# Patient Record
Sex: FEMALE | Race: WHITE | NOT HISPANIC OR LATINO | Employment: PART TIME | URBAN - METROPOLITAN AREA
[De-identification: names, ages, dates, MRNs, and addresses within clinical notes are randomized per-mention and may not be internally consistent; named-entity substitution may affect disease eponyms.]

---

## 2017-09-06 ENCOUNTER — NON-PROVIDER VISIT (OUTPATIENT)
Dept: OCCUPATIONAL MEDICINE | Facility: CLINIC | Age: 52
End: 2017-09-06

## 2017-09-06 DIAGNOSIS — Z11.1 ENCOUNTER FOR PPD TEST: ICD-10-CM

## 2017-09-06 PROCEDURE — 86580 TB INTRADERMAL TEST: CPT | Performed by: PREVENTIVE MEDICINE

## 2017-09-09 ENCOUNTER — NON-PROVIDER VISIT (OUTPATIENT)
Dept: URGENT CARE | Facility: CLINIC | Age: 52
End: 2017-09-09

## 2017-09-09 LAB — TB WHEAL 3D P 5 TU DIAM: NORMAL MM

## 2017-11-03 ENCOUNTER — TELEPHONE (OUTPATIENT)
Dept: CARDIOLOGY | Facility: MEDICAL CENTER | Age: 52
End: 2017-11-03

## 2017-11-03 NOTE — TELEPHONE ENCOUNTER
Left message on Dr. Dorado's answering machine to give me a call so we can attempt getting some records on patient.

## 2017-11-03 NOTE — TELEPHONE ENCOUNTER
Spoke with patient to see if she had seen a cardiologist before and to see if she had any recent cardiac testing; EKG done at Dr. Dorado's office and will call for that; no other testing done; advised to bring in pill bottles or a list of medications, ID and insurance cards and reminded her of date and time of appointment.

## 2017-11-07 ENCOUNTER — OFFICE VISIT (OUTPATIENT)
Dept: CARDIOLOGY | Facility: MEDICAL CENTER | Age: 52
End: 2017-11-07
Payer: MEDICARE

## 2017-11-07 VITALS
HEART RATE: 78 BPM | WEIGHT: 143 LBS | HEIGHT: 65 IN | OXYGEN SATURATION: 96 % | SYSTOLIC BLOOD PRESSURE: 102 MMHG | BODY MASS INDEX: 23.82 KG/M2 | DIASTOLIC BLOOD PRESSURE: 70 MMHG

## 2017-11-07 DIAGNOSIS — M79.7 FIBROMYALGIA: ICD-10-CM

## 2017-11-07 DIAGNOSIS — M47.817 LUMBOSACRAL SPONDYLOSIS WITHOUT MYELOPATHY: ICD-10-CM

## 2017-11-07 DIAGNOSIS — R00.2 PALPITATIONS: ICD-10-CM

## 2017-11-07 DIAGNOSIS — D35.2 PITUITARY ADENOMA (HCC): ICD-10-CM

## 2017-11-07 DIAGNOSIS — M47.814 THORACIC SPONDYLOSIS WITHOUT MYELOPATHY: ICD-10-CM

## 2017-11-07 PROCEDURE — 99204 OFFICE O/P NEW MOD 45 MIN: CPT | Performed by: INTERNAL MEDICINE

## 2017-11-07 RX ORDER — CYCLOBENZAPRINE HCL 10 MG
10 TABLET ORAL 3 TIMES DAILY PRN
COMMUNITY
End: 2019-02-20

## 2017-11-07 RX ORDER — ALPRAZOLAM 0.5 MG/1
0.5 TABLET ORAL NIGHTLY PRN
COMMUNITY
End: 2019-02-20

## 2017-11-07 RX ORDER — PANTOPRAZOLE SODIUM 20 MG/1
20 TABLET, DELAYED RELEASE ORAL DAILY
COMMUNITY

## 2017-11-07 RX ORDER — GABAPENTIN 600 MG/1
600 TABLET ORAL
COMMUNITY
End: 2019-09-03

## 2017-11-07 NOTE — PROGRESS NOTES
Subjective:   Enrique Briones is a 52 y.o. female who presents today For initial consultation regarding palpitations. She has a history of back pain status post spinal stimulator, seizure disorder on antiepileptic therapy and fibromyalgia. She has had palpitations for many many years, but they've increased over the past several months. They're responsive to Xanax and last for several minutes of time. She feels somewhat short of breath when they occur, no syncope or presyncope. She has never had a stroke. She notes no triggers. Mostly in the morning. He does not drink caffeine, smoke or drink. She does use medical marijuana. She is self described as very inactive. She has no exertional symptoms however. She also notes in passing history of pituitary adenoma which she has not had follow-up.    Past Medical History:   Diagnosis Date   • Fibromyalgia    • Headache(784.0)    • Migraine    • Seizure (CMS-HCC)     on meds - sz years ago -gm, catonic, petit mal     Past Surgical History:   Procedure Laterality Date   • CO DSTR NROLYTC AGNT PARVERTEB FCT SNGL LMBR/SACRAL Left 7/21/2015    Procedure: NEURO DEST FACET L/S W/IG SNGL - L4-S3;  Surgeon: Luis Manuel Mayfield;  Location: SURGERY [facility] ORS;  Service: Pain Management   • CO DSTR NROLYTC AGNT PARVERTEB FCT ADDL LMBR/SACRAL  7/21/2015    Procedure: NEURO DEST FACET L/S W/IG ADDL;  Surgeon: Luis Manuel Mayfield;  Location: SURGERY [facility] ORS;  Service: Pain Management   • PB INJECT RX OTHER PERIPH NERVE  7/21/2015    Procedure: NEUROLYTIC DEST-OTHER NERVE;  Surgeon: Luis Manuel Mayfield;  Location: SURGERY [facility] ORS;  Service: Pain Management   • PB INJECT RX OTHER PERIPH NERVE  7/21/2015    Procedure: NEUROLYTIC DEST-OTHER NERVE;  Surgeon: Luis Manuel Mayfield;  Location: SURGERY [facility] ORS;  Service: Pain Management   • PB INJECT RX OTHER PERIPH NERVE  7/21/2015    Procedure: NEUROLYTIC DEST-OTHER NERVE;  Surgeon: Luis Manuel Mayfield;  Location: SURGERY [facility] ORS;   Service: Pain Management   • NEURO DEST FACET C/T W/IG SNGL  3/10/2015    Performed by Luis Manuel Mayfield at [facility] ORS   • NEURO DEST FACET C/T W/IG ADDL  3/10/2015    Performed by Luis Manuel Mayfield at [facility] ORS   • NEURO DEST FACET C/T W/IG ADDL  3/10/2015    Performed by Luis Manuel Mayfield at [facility] ORS   • NEURO DEST FACET C/T W/IG ADDL  3/10/2015    Performed by Luis Manuel Mayfield at [facility] ORS   • NEURO DEST FACET L/S W/IG SNGL  11/18/2014    Performed by Luis Manuel Mayfield at [facility] ORS   • NEURO DEST FACET L/S W/IG ADDL  11/18/2014    Performed by Luis Manuel Mayfield at [facility] ORS   • OTHER ORTHOPEDIC SURGERY      right hip surgery     History reviewed. No pertinent family history.  History   Smoking Status   • Former Smoker   Smokeless Tobacco   • Never Used     Allergies   Allergen Reactions   • Codeine Vomiting   • Aspirin Nausea   • Latex Itching     Outpatient Encounter Prescriptions as of 11/7/2017   Medication Sig Dispense Refill   • gabapentin (NEURONTIN) 600 MG tablet Take 600 mg by mouth 3 times a day.     • cyclobenzaprine (FLEXERIL) 10 MG Tab Take 10 mg by mouth 3 times a day as needed.     • pantoprazole (PROTONIX) 20 MG tablet Take 20 mg by mouth every day.     • conjugated estrogen (PREMARIN) 0.625 MG Tab Take 0.625 mg by mouth every day.     • alprazolam (XANAX) 0.5 MG Tab Take 0.5 mg by mouth at bedtime as needed for Sleep.     • vitamin D, Ergocalciferol, (DRISDOL) 06444 UNITS CAPS capsule Take 50,000 Units by mouth every 14 days.     • Multiple Vitamins-Minerals (MULTI VITAMIN/MINERALS PO) Take  by mouth.     • divalproex EC (DEPAKOTE) 500 MG TBEC Take 500 mg by mouth 3 times a day.     • topiramate (TOPAMAX) 100 MG TABS Take 100 mg by mouth 2 times a day.     • duloxetine (CYMBALTA) 60 MG CPEP Take 60 mg by mouth every day.     • alprazolam (XANAX) 1 MG TABS Take 2 mg by mouth at bedtime as needed.     • [DISCONTINUED] cyclobenzaprine  "(FLEXERIL) 5 MG tablet Take 5-10 mg by mouth 3 times a day as needed.     • [DISCONTINUED] ondansetron (ZOFRAN ODT) 4 MG TABLET DISPERSIBLE Take 1 Tab by mouth every 6 hours as needed for Nausea/Vomiting. 10 Tab 0   • [DISCONTINUED] gabapentin (NEURONTIN) 400 MG CAPS Take 600 mg by mouth 3 times a day.     • [DISCONTINUED] Esomeprazole Magnesium (NEXIUM PO) Take  by mouth.     • [DISCONTINUED] conjugated estrogen (PREMARIN) 0.9 MG TABS Take 0.9 mg by mouth every day. DAYS 1-25     • [DISCONTINUED] promethazine (PHENERGAN) 25 MG TABS Take 25 mg by mouth every 6 hours as needed.       No facility-administered encounter medications on file as of 11/7/2017.      Review of Systems   All other systems reviewed and are negative.       Objective:   /70   Pulse 78   Ht 1.651 m (5' 5\")   Wt 64.9 kg (143 lb)   SpO2 96%   BMI 23.80 kg/m²     Physical Exam   Constitutional: She is oriented to person, place, and time. She appears well-developed and well-nourished. No distress.   HENT:   Head: Normocephalic and atraumatic.   Mouth/Throat: Oropharynx is clear and moist. No oropharyngeal exudate.   Eyes: Conjunctivae are normal. Pupils are equal, round, and reactive to light. No scleral icterus.   Neck: Normal range of motion. Neck supple. No JVD present. No thyromegaly present.   Cardiovascular: Normal rate, regular rhythm, normal heart sounds and intact distal pulses.  Exam reveals no gallop and no friction rub.    No murmur heard.  Pulses:       Carotid pulses are 2+ on the right side, and 2+ on the left side.       Radial pulses are 2+ on the right side, and 2+ on the left side.        Popliteal pulses are 2+ on the right side, and 2+ on the left side.        Dorsalis pedis pulses are 2+ on the right side, and 2+ on the left side.        Posterior tibial pulses are 2+ on the right side, and 2+ on the left side.   Pulmonary/Chest: Effort normal and breath sounds normal. She has no wheezes. She has no rales. "   Abdominal: Soft. Bowel sounds are normal. She exhibits no distension. There is no tenderness.   Musculoskeletal: She exhibits no edema or tenderness.   Neurological: She is alert and oriented to person, place, and time. No cranial nerve deficit (Cranial nerves II through XII grossly intact).   Skin: Skin is warm and dry. No rash noted. She is not diaphoretic. No erythema.   Psychiatric: She has a normal mood and affect. Her behavior is normal.   Vitals reviewed.    EKG (9/29/2017):  I have personally reviewed the EKG this visit and discussed with the patient. It shows sinus rhythm 66 with incomplete right bundle branch block.    Assessment:     1. Palpitations  EKG    ECHOCARDIOGRAM COMP W/O CONT    COMP METABOLIC PANEL    LIPID PROFILE    TSH    FREE THYROXINE    CARDIAC STRESS TEST TREADMILL ONLY    HOLTER MONITOR STUDY   2. Fibromyalgia     3. Lumbosacral spondylosis without myelopathy     4. Thoracic spondylosis without myelopathy     5. Pituitary adenoma (CMS-HCC)         Medical Decision Making:  Today's Assessment / Status / Plan:     She has no alarm symptoms. Her palpitations were present for many years, worse in several months ago and has slowly been improving. They occur several times per week. They're nonexertional. She has no triggers. She has an adenoma that was not followed up. I recommended she discuss this with her primary physician as I'm unclear on her history with regard to this. She is interested in a stress test as well as she has a family history of early coronary artery disease.    1. CBC, CMP, lipid profile, TSH and free T4  2. Holter monitor 48 hours  3. Treadmill stress test  4. Echocardiogram    Femur was unremarkable recommend reassurance. If there are abnormalities detected we will discuss these in follow-up. Reassurance was offered.        Thank you for this interesting consultation. It was my pleasure to see Enrique Briones today.    Erik Hernandez MD, FACC,  FSCAI  Division of Interventional Cardiology  [facility]

## 2017-11-07 NOTE — LETTER
[facility]   [address], [address]  [place] [address]  Phone: [phone]  Fax: [phone]              Enrique Briones  1965    Encounter Date: 11/7/2017    ,     Thank you for the referral. I had the pleasure of seeing Enrique Briones today in cardiology clinic. I've attached my visit note below. If you have any questions please feel free to give me a call anytime.      Erik Hernandez MD, FACC, [facility]  Interventional Cardiology  [facility]                                                                    PROGRESS NOTE:  Subjective:   Enrique Briones is a 52 y.o. female who presents today For initial consultation regarding palpitations. She has a history of back pain status post spinal stimulator, seizure disorder on antiepileptic therapy and fibromyalgia. She has had palpitations for many many years, but they've increased over the past several months. They're responsive to Xanax and last for several minutes of time. She feels somewhat short of breath when they occur, no syncope or presyncope. She has never had a stroke. She notes no triggers. Mostly in the morning. He does not drink caffeine, smoke or drink. She does use medical marijuana. She is self described as very inactive. She has no exertional symptoms however. She also notes in passing history of pituitary adenoma which she has not had follow-up.    Past Medical History:   Diagnosis Date   • Fibromyalgia    • Headache(784.0)    • Migraine    • Seizure (CMS-HCC)     on meds - sz years ago -gm, catonic, petit mal     Past Surgical History:   Procedure Laterality Date   • ME DSTR NROLYTC AGNT PARVERTEB FCT SNGL LMBR/SACRAL Left 7/21/2015    Procedure: NEURO DEST FACET L/S W/IG SNGL - L4-S3;  Surgeon: Luis Manuel Mayfield;  Location: [facility];  Service: Pain Management   • ME DSTR NROLYTC AGNT PARVERTEB FCT ADDL  LMBR/SACRAL  7/21/2015    Procedure: NEURO DEST FACET L/S W/IG ADDL;  Surgeon: Luis Manuel Mayfield;  Location: [facility] ORS;  Service: Pain Management   • PB INJECT RX OTHER PERIPH NERVE  7/21/2015    Procedure: NEUROLYTIC DEST-OTHER NERVE;  Surgeon: Luis Manuel Mayfield;  Location: [facility] ORS;  Service: Pain Management   • PB INJECT RX OTHER PERIPH NERVE  7/21/2015    Procedure: NEUROLYTIC DEST-OTHER NERVE;  Surgeon: Luis Manuel Mayfield;  Location: [facility] ORS;  Service: Pain Management   • PB INJECT RX OTHER PERIPH NERVE  7/21/2015    Procedure: NEUROLYTIC DEST-OTHER NERVE;  Surgeon: Luis Manuel Mayfield;  Location: [facility] ORS;  Service: Pain Management   • NEURO DEST FACET C/T W/IG SNGL  3/10/2015    Performed by Luis Manuel Mayfield at [facility] ORS   • NEURO DEST FACET C/T W/IG ADDL  3/10/2015    Performed by Luis Manuel Mayfield at [facility] ORS   • NEURO DEST FACET C/T W/IG ADDL  3/10/2015    Performed by Luis Manuel Mayfield at [facility] ORS   • NEURO DEST FACET C/T W/IG ADDL  3/10/2015    Performed by Luis Manuel Mayfield at [facility] ORS   • NEURO DEST FACET L/S W/IG SNGL  11/18/2014    Performed by Luis Manuel Mayfield at [facility] ORS   • NEURO DEST FACET L/S W/IG ADDL  11/18/2014    Performed by Luis Manuel Mayfield at [facility]   • OTHER ORTHOPEDIC SURGERY      right hip surgery     History reviewed. No pertinent family history.  History   Smoking Status   • Former Smoker   Smokeless Tobacco   • Never Used     Allergies   Allergen Reactions   • Codeine Vomiting   • Aspirin Nausea   • Latex Itching     Outpatient Encounter Prescriptions as of 11/7/2017   Medication Sig Dispense Refill   • gabapentin (NEURONTIN) 600 MG tablet Take 600 mg by mouth 3 times a day.     • cyclobenzaprine (FLEXERIL) 10 MG Tab Take 10 mg by mouth 3 times a day as needed.     • pantoprazole (PROTONIX) 20 MG tablet Take 20 mg by mouth every day.     • conjugated estrogen (PREMARIN)  "0.625 MG Tab Take 0.625 mg by mouth every day.     • alprazolam (XANAX) 0.5 MG Tab Take 0.5 mg by mouth at bedtime as needed for Sleep.     • vitamin D, Ergocalciferol, (DRISDOL) 93840 UNITS CAPS capsule Take 50,000 Units by mouth every 14 days.     • Multiple Vitamins-Minerals (MULTI VITAMIN/MINERALS PO) Take  by mouth.     • divalproex EC (DEPAKOTE) 500 MG TBEC Take 500 mg by mouth 3 times a day.     • topiramate (TOPAMAX) 100 MG TABS Take 100 mg by mouth 2 times a day.     • duloxetine (CYMBALTA) 60 MG CPEP Take 60 mg by mouth every day.     • alprazolam (XANAX) 1 MG TABS Take 2 mg by mouth at bedtime as needed.     • [DISCONTINUED] cyclobenzaprine (FLEXERIL) 5 MG tablet Take 5-10 mg by mouth 3 times a day as needed.     • [DISCONTINUED] ondansetron (ZOFRAN ODT) 4 MG TABLET DISPERSIBLE Take 1 Tab by mouth every 6 hours as needed for Nausea/Vomiting. 10 Tab 0   • [DISCONTINUED] gabapentin (NEURONTIN) 400 MG CAPS Take 600 mg by mouth 3 times a day.     • [DISCONTINUED] Esomeprazole Magnesium (NEXIUM PO) Take  by mouth.     • [DISCONTINUED] conjugated estrogen (PREMARIN) 0.9 MG TABS Take 0.9 mg by mouth every day. DAYS 1-25     • [DISCONTINUED] promethazine (PHENERGAN) 25 MG TABS Take 25 mg by mouth every 6 hours as needed.       No facility-administered encounter medications on file as of 11/7/2017.      Review of Systems   All other systems reviewed and are negative.       Objective:   /70   Pulse 78   Ht 1.651 m (5' 5\")   Wt 64.9 kg (143 lb)   SpO2 96%   BMI 23.80 kg/m²      Physical Exam   Constitutional: She is oriented to person, place, and time. She appears well-developed and well-nourished. No distress.   HENT:   Head: Normocephalic and atraumatic.   Mouth/Throat: Oropharynx is clear and moist. No oropharyngeal exudate.   Eyes: Conjunctivae are normal. Pupils are equal, round, and reactive to light. No scleral icterus.   Neck: Normal range of motion. Neck supple. No JVD present. No thyromegaly " present.   Cardiovascular: Normal rate, regular rhythm, normal heart sounds and intact distal pulses.  Exam reveals no gallop and no friction rub.    No murmur heard.  Pulses:       Carotid pulses are 2+ on the right side, and 2+ on the left side.       Radial pulses are 2+ on the right side, and 2+ on the left side.        Popliteal pulses are 2+ on the right side, and 2+ on the left side.        Dorsalis pedis pulses are 2+ on the right side, and 2+ on the left side.        Posterior tibial pulses are 2+ on the right side, and 2+ on the left side.   Pulmonary/Chest: Effort normal and breath sounds normal. She has no wheezes. She has no rales.   Abdominal: Soft. Bowel sounds are normal. She exhibits no distension. There is no tenderness.   Musculoskeletal: She exhibits no edema or tenderness.   Neurological: She is alert and oriented to person, place, and time. No cranial nerve deficit (Cranial nerves II through XII grossly intact).   Skin: Skin is warm and dry. No rash noted. She is not diaphoretic. No erythema.   Psychiatric: She has a normal mood and affect. Her behavior is normal.   Vitals reviewed.    EKG (9/29/2017):  I have personally reviewed the EKG this visit and discussed with the patient. It shows sinus rhythm 66 with incomplete right bundle branch block.    Assessment:     1. Palpitations  EKG    ECHOCARDIOGRAM COMP W/O CONT    COMP METABOLIC PANEL    LIPID PROFILE    TSH    FREE THYROXINE    CARDIAC STRESS TEST TREADMILL ONLY    HOLTER MONITOR STUDY   2. Fibromyalgia     3. Lumbosacral spondylosis without myelopathy     4. Thoracic spondylosis without myelopathy     5. Pituitary adenoma (CMS-[facility])         Medical Decision Making:  Today's Assessment / Status / Plan:     She has no alarm symptoms. Her palpitations were present for many years, worse in several months ago and has slowly been improving. They occur several times per week. They're nonexertional. She has no triggers. She has an adenoma that  was not followed up. I recommended she discuss this with her primary physician as I'm unclear on her history with regard to this. She is interested in a stress test as well as she has a family history of early coronary artery disease.    1. CBC, CMP, lipid profile, TSH and free T4  2. Holter monitor 48 hours  3. Treadmill stress test  4. Echocardiogram    Femur was unremarkable recommend reassurance. If there are abnormalities detected we will discuss these in follow-up. Reassurance was offered.        Thank you for this interesting consultation. It was my pleasure to see Enrique Briones today.    Erik Hernandez MD, FACC, [facility]  Division of Interventional Cardiology  [facility]        Mohsen Tamasaby, M.D.  [address] [address]  VIA Facsimile: [phone]

## 2017-11-15 ENCOUNTER — HOSPITAL ENCOUNTER (OUTPATIENT)
Dept: LAB | Facility: MEDICAL CENTER | Age: 52
End: 2017-11-15
Attending: INTERNAL MEDICINE
Payer: MEDICARE

## 2017-11-15 DIAGNOSIS — R00.2 PALPITATIONS: ICD-10-CM

## 2017-11-15 LAB
ALBUMIN SERPL BCP-MCNC: 4.2 G/DL (ref 3.2–4.9)
ALBUMIN/GLOB SERPL: 1.3 G/DL
ALP SERPL-CCNC: 49 U/L (ref 30–99)
ALT SERPL-CCNC: 13 U/L (ref 2–50)
ANION GAP SERPL CALC-SCNC: 5 MMOL/L (ref 0–11.9)
AST SERPL-CCNC: 20 U/L (ref 12–45)
BILIRUB SERPL-MCNC: 0.6 MG/DL (ref 0.1–1.5)
BUN SERPL-MCNC: 15 MG/DL (ref 8–22)
CALCIUM SERPL-MCNC: 9.7 MG/DL (ref 8.5–10.5)
CHLORIDE SERPL-SCNC: 108 MMOL/L (ref 96–112)
CHOLEST SERPL-MCNC: 219 MG/DL (ref 100–199)
CO2 SERPL-SCNC: 24 MMOL/L (ref 20–33)
CREAT SERPL-MCNC: 0.72 MG/DL (ref 0.5–1.4)
GFR SERPL CREATININE-BSD FRML MDRD: >60 ML/MIN/1.73 M 2
GLOBULIN SER CALC-MCNC: 3.2 G/DL (ref 1.9–3.5)
GLUCOSE SERPL-MCNC: 90 MG/DL (ref 65–99)
HDLC SERPL-MCNC: 77 MG/DL
LDLC SERPL CALC-MCNC: 130 MG/DL
POTASSIUM SERPL-SCNC: 4 MMOL/L (ref 3.6–5.5)
PROT SERPL-MCNC: 7.4 G/DL (ref 6–8.2)
SODIUM SERPL-SCNC: 137 MMOL/L (ref 135–145)
T4 FREE SERPL-MCNC: 0.76 NG/DL (ref 0.53–1.43)
TRIGL SERPL-MCNC: 58 MG/DL (ref 0–149)
TSH SERPL DL<=0.005 MIU/L-ACNC: 1.15 UIU/ML (ref 0.3–3.7)

## 2017-11-15 PROCEDURE — 80061 LIPID PANEL: CPT | Mod: GA

## 2017-11-15 PROCEDURE — 80053 COMPREHEN METABOLIC PANEL: CPT

## 2017-11-15 PROCEDURE — 36415 COLL VENOUS BLD VENIPUNCTURE: CPT

## 2017-11-15 PROCEDURE — 84443 ASSAY THYROID STIM HORMONE: CPT

## 2017-11-15 PROCEDURE — 84439 ASSAY OF FREE THYROXINE: CPT

## 2017-11-29 ENCOUNTER — HOSPITAL ENCOUNTER (OUTPATIENT)
Dept: CARDIOLOGY | Facility: MEDICAL CENTER | Age: 52
End: 2017-11-29
Attending: INTERNAL MEDICINE
Payer: MEDICARE

## 2017-11-29 ENCOUNTER — HOSPITAL ENCOUNTER (OUTPATIENT)
Dept: RADIOLOGY | Facility: MEDICAL CENTER | Age: 52
End: 2017-11-29
Attending: INTERNAL MEDICINE
Payer: MEDICARE

## 2017-11-29 DIAGNOSIS — R00.2 PALPITATIONS: ICD-10-CM

## 2017-11-29 PROCEDURE — 93306 TTE W/DOPPLER COMPLETE: CPT

## 2017-11-29 PROCEDURE — 93306 TTE W/DOPPLER COMPLETE: CPT | Mod: 26 | Performed by: INTERNAL MEDICINE

## 2017-11-29 PROCEDURE — 93018 CV STRESS TEST I&R ONLY: CPT | Performed by: INTERNAL MEDICINE

## 2017-11-29 PROCEDURE — 93017 CV STRESS TEST TRACING ONLY: CPT | Performed by: INTERNAL MEDICINE

## 2017-11-30 LAB
LV EJECT FRACT  99904: 70
LV EJECT FRACT MOD 2C 99903: 84.2
LV EJECT FRACT MOD 4C 99902: 55.48
LV EJECT FRACT MOD BP 99901: 72.09

## 2017-11-30 NOTE — PROCEDURES
DATE OF SERVICE:  11/29/2017    TREADMILL STRESS TEST    The baseline EKG is sinus rhythm.  RSR prime pattern in V1.  The patient has   good exercise tolerance, achieved 10.1 mets, appropriate heart rate and blood   pressure response, negative EKG criteria for inducible ischemia.  No   chronotropic incompetence.  No heart rate recovering delay.  The Duke score is   9.0, low cardiovascular risk.       ____________________________________     Ileana Mckinney MD, PhD, [facility] / [facility]    DD:  11/29/2017 17:24:20  DT:  11/30/2017 00:59:45    D#:  [id number]  Job#:  [id number]

## 2017-12-08 ENCOUNTER — TELEPHONE (OUTPATIENT)
Dept: CARDIOLOGY | Facility: MEDICAL CENTER | Age: 52
End: 2017-12-08

## 2017-12-08 NOTE — TELEPHONE ENCOUNTER
----- Message from Erik Hernandez M.D. sent at 12/7/2017  2:04 PM PST -----  Perfectly normal echocardiogram and a normal stress treadmill test.

## 2017-12-08 NOTE — TELEPHONE ENCOUNTER
Called patient, advised her of Dr. Hernandez's echocardiogram and treadmill stress test interpretations.    JOSSELYN RAO

## 2018-01-04 ENCOUNTER — NON-PROVIDER VISIT (OUTPATIENT)
Dept: CARDIOLOGY | Facility: MEDICAL CENTER | Age: 53
End: 2018-01-04
Payer: MEDICARE

## 2018-01-04 DIAGNOSIS — R00.2 PALPITATIONS: ICD-10-CM

## 2018-01-04 DIAGNOSIS — I47.10 SVT (SUPRAVENTRICULAR TACHYCARDIA): ICD-10-CM

## 2018-01-09 DIAGNOSIS — R00.2 PALPITATIONS: ICD-10-CM

## 2018-01-10 LAB — EKG IMPRESSION: NORMAL

## 2018-01-10 PROCEDURE — 93224 XTRNL ECG REC UP TO 48 HRS: CPT | Performed by: INTERNAL MEDICINE

## 2018-01-22 ENCOUNTER — TELEPHONE (OUTPATIENT)
Dept: CARDIOLOGY | Facility: MEDICAL CENTER | Age: 53
End: 2018-01-22

## 2018-01-22 NOTE — TELEPHONE ENCOUNTER
Pt notified of monitor results per Dr. Hernandez. She denies any questions at this time and will FU with TW on 2/6/18.    ----- Message from Erik Hernandez M.D. sent at 1/19/2018  3:52 PM PST -----  Nothing concerning on her monitor. She reported symptoms but there were no abnormalities during her reported times.

## 2018-03-06 ENCOUNTER — OFFICE VISIT (OUTPATIENT)
Dept: CARDIOLOGY | Facility: MEDICAL CENTER | Age: 53
End: 2018-03-06
Payer: MEDICARE

## 2018-03-06 VITALS
OXYGEN SATURATION: 96 % | WEIGHT: 143 LBS | BODY MASS INDEX: 23.82 KG/M2 | HEIGHT: 65 IN | HEART RATE: 88 BPM | SYSTOLIC BLOOD PRESSURE: 146 MMHG | DIASTOLIC BLOOD PRESSURE: 80 MMHG

## 2018-03-06 DIAGNOSIS — R00.2 PALPITATIONS: ICD-10-CM

## 2018-03-06 DIAGNOSIS — F44.5 CONVERSION DISORDER WITH SEIZURES OR CONVULSIONS: ICD-10-CM

## 2018-03-06 DIAGNOSIS — G43.019 MIGRAINE WITHOUT AURA, INTRACTABLE: ICD-10-CM

## 2018-03-06 DIAGNOSIS — M79.7 FIBROMYALGIA: ICD-10-CM

## 2018-03-06 PROCEDURE — 99214 OFFICE O/P EST MOD 30 MIN: CPT | Performed by: INTERNAL MEDICINE

## 2018-03-06 NOTE — PROGRESS NOTES
Subjective:   Enrique Briones is a 52 y.o. female who presents today for follow-up of palpitations. She has a history of back pain status post spinal stimulator, seizure disorder on antiepileptic therapy and fibromyalgia. She has had palpitations for many many years, but they've increased over the past several months. They're responsive to Xanax and last for several minutes of time. She feels somewhat short of breath when they occur, no syncope or presyncope. She has never had a stroke. She notes no triggers. Mostly in the morning. He does not drink caffeine, smoke or drink. She does use medical marijuana. She is self described as very inactive. She has no exertional symptoms however. She also notes in passing history of pituitary adenoma which she has not had follow-up.    In the interim she has had a Holter monitor that she says though she had symptoms they were mild compared to what she often will have, and it was completely normal monitor with no abnormalities. She also had a very normal echocardiogram and stress test.    Past Medical History:   Diagnosis Date   • Fibromyalgia    • Headache(784.0)    • Migraine    • Seizure (CMS-HCC)     on meds - sz years ago -gm, catonic, petit mal     Past Surgical History:   Procedure Laterality Date   • MI DSTR NROLYTC AGNT PARVERTEB FCT SNGL LMBR/SACRAL Left 7/21/2015    Procedure: NEURO DEST FACET L/S W/IG SNGL - L4-S3;  Surgeon: Luis Manuel Mayfield;  Location: SURGERY SURGICAL [facility] ORS;  Service: Pain Management   • MI DSTR NROLYTC AGNT PARVERTEB FCT ADDL LMBR/SACRAL  7/21/2015    Procedure: NEURO DEST FACET L/S W/IG ADDL;  Surgeon: Luis Manuel Mayfield;  Location: [facility];  Service: Pain Management   • PB INJECT RX OTHER PERIPH NERVE  7/21/2015    Procedure: NEUROLYTIC DEST-OTHER NERVE;  Surgeon: Luis Manuel Mayfield;  Location: SURGERY SURGICAL [facility];  Service: Pain Management   • PB INJECT RX OTHER PERIPH NERVE  7/21/2015    Procedure: NEUROLYTIC DEST-OTHER  NERVE;  Surgeon: Luis Manuel Mayfield;  Location: [facility] ORS;  Service: Pain Management   • PB INJECT RX OTHER PERIPH NERVE  7/21/2015    Procedure: NEUROLYTIC DEST-OTHER NERVE;  Surgeon: Luis Manuel Mayfield;  Location: [facility] ORS;  Service: Pain Management   • NEURO DEST FACET C/T W/IG SNGL  3/10/2015    Performed by Luis Manuel Mayfield at [facility] ORS   • NEURO DEST FACET C/T W/IG ADDL  3/10/2015    Performed by Luis Manuel Mayfield at [facility] ORS   • NEURO DEST FACET C/T W/IG ADDL  3/10/2015    Performed by Luis Manuel Mayfield at [facility] ORS   • NEURO DEST FACET C/T W/IG ADDL  3/10/2015    Performed by Luis Manuel Mayfield at [facility] ORS   • NEURO DEST FACET L/S W/IG SNGL  11/18/2014    Performed by Luis Manuel Mayfield at [facility] ORS   • NEURO DEST FACET L/S W/IG ADDL  11/18/2014    Performed by Luis Manuel Mayfield at [facility] ORS   • OTHER ORTHOPEDIC SURGERY      right hip surgery     History reviewed. No pertinent family history.  History   Smoking Status   • Former Smoker   Smokeless Tobacco   • Never Used     Allergies   Allergen Reactions   • Codeine Vomiting   • Aspirin Nausea   • Latex Itching     Outpatient Encounter Prescriptions as of 3/6/2018   Medication Sig Dispense Refill   • gabapentin (NEURONTIN) 600 MG tablet Take 600 mg by mouth 3 times a day.     • cyclobenzaprine (FLEXERIL) 10 MG Tab Take 10 mg by mouth 3 times a day as needed.     • pantoprazole (PROTONIX) 20 MG tablet Take 20 mg by mouth every day.     • conjugated estrogen (PREMARIN) 0.625 MG Tab Take 0.625 mg by mouth every day.     • alprazolam (XANAX) 0.5 MG Tab Take 0.5 mg by mouth at bedtime as needed for Sleep.     • Multiple Vitamins-Minerals (MULTI VITAMIN/MINERALS PO) Take  by mouth.     • divalproex EC (DEPAKOTE) 500 MG TBEC Take 500 mg by mouth 3 times a day.     • topiramate (TOPAMAX) 100 MG TABS Take 100 mg by mouth 2 times a day.     • duloxetine (CYMBALTA) 60 MG CPEP Take 60 mg by  "mouth every day.     • alprazolam (XANAX) 1 MG TABS Take 2 mg by mouth at bedtime as needed.     • vitamin D, Ergocalciferol, (DRISDOL) 85659 UNITS CAPS capsule Take 50,000 Units by mouth every 14 days.       No facility-administered encounter medications on file as of 3/6/2018.      Review of Systems   All other systems reviewed and are negative.       Objective:   /80   Pulse 88   Ht 1.651 m (5' 5\")   Wt 64.9 kg (143 lb)   SpO2 96%   BMI 23.80 kg/m²     Physical Exam   Constitutional: She is oriented to person, place, and time. She appears well-developed and well-nourished. No distress.   HENT:   Head: Normocephalic and atraumatic.   Mouth/Throat: Oropharynx is clear and moist. No oropharyngeal exudate.   Eyes: Conjunctivae are normal. Pupils are equal, round, and reactive to light. No scleral icterus.   Neck: Normal range of motion. Neck supple. No JVD present. No thyromegaly present.   Cardiovascular: Normal rate, regular rhythm, normal heart sounds and intact distal pulses.  Exam reveals no gallop and no friction rub.    No murmur heard.  Pulses:       Carotid pulses are 2+ on the right side, and 2+ on the left side.       Radial pulses are 2+ on the right side, and 2+ on the left side.        Popliteal pulses are 2+ on the right side, and 2+ on the left side.        Dorsalis pedis pulses are 2+ on the right side, and 2+ on the left side.        Posterior tibial pulses are 2+ on the right side, and 2+ on the left side.   Pulmonary/Chest: Effort normal and breath sounds normal. She has no wheezes. She has no rales.   Abdominal: Soft. Bowel sounds are normal. She exhibits no distension. There is no tenderness.   Musculoskeletal: She exhibits no edema or tenderness.   Neurological: She is alert and oriented to person, place, and time. No cranial nerve deficit (Cranial nerves II through XII grossly intact).   Skin: Skin is warm and dry. No rash noted. She is not diaphoretic. No erythema.   Psychiatric: " She has a normal mood and affect. Her behavior is normal.   Vitals reviewed.    EKG (9/29/2017):  I have personally reviewed the EKG this visit and discussed with the patient. It shows sinus rhythm 66 with incomplete right bundle branch block.    Assessment:     1. Palpitations  HOLTER MONITOR / EVENT RECORDER   2. Fibromyalgia     3. Migraine without aura, intractable     4. Conversion disorder with seizures or convulsions         Medical Decision Making:  Today's Assessment / Status / Plan:     She has no alarm symptoms. Her palpitations did not correlate with any rhythm abnormalities on testing however she says her symptoms were very mild while wearing the 48 hour monitor. Laboratory studies are unremarkable. All cardiac studies thus far are low risk and very reassuring.    1. 21 day monitor with symptom diary. Rreassurance was offered.       Thank you for this interesting consultation. It was my pleasure to see Enrique Briones today.    Erik Hernandez MD, FACC, [facility]  Division of Interventional Cardiology  [facility]

## 2018-03-13 ENCOUNTER — NON-PROVIDER VISIT (OUTPATIENT)
Dept: CARDIOLOGY | Facility: MEDICAL CENTER | Age: 53
End: 2018-03-13
Attending: INTERNAL MEDICINE
Payer: MEDICARE

## 2018-03-13 DIAGNOSIS — R00.2 PALPITATIONS: ICD-10-CM

## 2018-03-14 ENCOUNTER — TELEPHONE (OUTPATIENT)
Dept: CARDIOLOGY | Facility: MEDICAL CENTER | Age: 53
End: 2018-03-14

## 2018-04-11 PROCEDURE — 0296T PR EXT ECG > 48HR TO 21 DAY RCRD W/CONECT INTL RCRD: CPT | Performed by: INTERNAL MEDICINE

## 2018-04-11 PROCEDURE — 0298T PR EXT ECG > 48HR TO 21 DAY REVIEW AND INTERPRETATN: CPT | Performed by: INTERNAL MEDICINE

## 2018-04-12 ENCOUNTER — TELEPHONE (OUTPATIENT)
Dept: CARDIOLOGY | Facility: MEDICAL CENTER | Age: 53
End: 2018-04-12

## 2018-04-12 NOTE — TELEPHONE ENCOUNTER
"Zio patch reviewed by Dr. Hernandez. Per TW, \"Normal sinus rhythm. Symptoms without heart rhythm abnormalities.\"    L/m educating pt of results. Informed her to please call back to discuss any questions or concerns. FU with TW 9/4  "

## 2018-08-18 ENCOUNTER — OFFICE VISIT (OUTPATIENT)
Dept: URGENT CARE | Facility: CLINIC | Age: 53
End: 2018-08-18
Payer: MEDICARE

## 2018-08-18 VITALS
DIASTOLIC BLOOD PRESSURE: 64 MMHG | RESPIRATION RATE: 16 BRPM | TEMPERATURE: 97.9 F | HEIGHT: 65 IN | SYSTOLIC BLOOD PRESSURE: 128 MMHG | WEIGHT: 143 LBS | BODY MASS INDEX: 23.82 KG/M2 | OXYGEN SATURATION: 97 % | HEART RATE: 72 BPM

## 2018-08-18 DIAGNOSIS — J06.9 UPPER RESPIRATORY TRACT INFECTION, UNSPECIFIED TYPE: ICD-10-CM

## 2018-08-18 PROCEDURE — 99204 OFFICE O/P NEW MOD 45 MIN: CPT | Performed by: PHYSICIAN ASSISTANT

## 2018-08-18 RX ORDER — BENZONATATE 200 MG/1
200 CAPSULE ORAL 3 TIMES DAILY PRN
Qty: 30 CAP | Refills: 0 | Status: SHIPPED | OUTPATIENT
Start: 2018-08-18 | End: 2019-02-20

## 2018-08-18 RX ORDER — AMOXICILLIN AND CLAVULANATE POTASSIUM 875; 125 MG/1; MG/1
1 TABLET, FILM COATED ORAL 2 TIMES DAILY
Qty: 14 TAB | Refills: 0 | Status: SHIPPED | OUTPATIENT
Start: 2018-08-18 | End: 2018-08-25

## 2018-08-18 ASSESSMENT — ENCOUNTER SYMPTOMS
SHORTNESS OF BREATH: 0
WHEEZING: 0
PALPITATIONS: 0
FEVER: 0
CHILLS: 0
SORE THROAT: 1
COUGH: 1
MYALGIAS: 1
SPUTUM PRODUCTION: 0
HEMOPTYSIS: 0

## 2018-08-18 NOTE — PROGRESS NOTES
Subjective:      Enrique Briones is a 53 y.o. female who presents with Cough (x 1 day w/possible URI)            Cough   This is a new problem. The current episode started yesterday. The problem has been unchanged. The cough is non-productive. Associated symptoms include ear congestion, myalgias and a sore throat. Pertinent negatives include no chest pain, chills, ear pain, fever, hemoptysis, shortness of breath or wheezing. Nothing aggravates the symptoms. She has tried nothing for the symptoms.       Review of Systems   Constitutional: Positive for malaise/fatigue. Negative for chills and fever.   HENT: Positive for congestion and sore throat. Negative for ear pain.    Respiratory: Positive for cough. Negative for hemoptysis, sputum production, shortness of breath and wheezing.    Cardiovascular: Negative for chest pain and palpitations.   Musculoskeletal: Positive for myalgias.   All other systems reviewed and are negative.    PMH:  has a past medical history of Fibromyalgia; Headache(784.0); Migraine; and Seizure (HCC). She also has no past medical history of Pregnant state, incidental.  MEDS:   Current Outpatient Prescriptions:   •  amoxicillin-clavulanate (AUGMENTIN) 875-125 MG Tab, Take 1 Tab by mouth 2 times a day for 7 days., Disp: 14 Tab, Rfl: 0  •  benzonatate (TESSALON) 200 MG capsule, Take 1 Cap by mouth 3 times a day as needed for Cough., Disp: 30 Cap, Rfl: 0  •  gabapentin (NEURONTIN) 600 MG tablet, Take 600 mg by mouth 3 times a day., Disp: , Rfl:   •  pantoprazole (PROTONIX) 20 MG tablet, Take 20 mg by mouth every day., Disp: , Rfl:   •  conjugated estrogen (PREMARIN) 0.625 MG Tab, Take 0.625 mg by mouth every day., Disp: , Rfl:   •  alprazolam (XANAX) 0.5 MG Tab, Take 0.5 mg by mouth at bedtime as needed for Sleep., Disp: , Rfl:   •  vitamin D, Ergocalciferol, (DRISDOL) 41937 UNITS CAPS capsule, Take 50,000 Units by mouth every 14 days., Disp: , Rfl:   •  Multiple Vitamins-Minerals (MULTI  VITAMIN/MINERALS PO), Take  by mouth., Disp: , Rfl:   •  divalproex EC (DEPAKOTE) 500 MG TBEC, Take 500 mg by mouth 3 times a day., Disp: , Rfl:   •  topiramate (TOPAMAX) 100 MG TABS, Take 100 mg by mouth 2 times a day., Disp: , Rfl:   •  duloxetine (CYMBALTA) 60 MG CPEP, Take 60 mg by mouth every day., Disp: , Rfl:   •  cyclobenzaprine (FLEXERIL) 10 MG Tab, Take 10 mg by mouth 3 times a day as needed., Disp: , Rfl:   •  alprazolam (XANAX) 1 MG TABS, Take 2 mg by mouth at bedtime as needed., Disp: , Rfl:   ALLERGIES:   Allergies   Allergen Reactions   • Codeine Vomiting   • Aspirin Nausea   • Latex Itching     SURGHX:   Past Surgical History:   Procedure Laterality Date   • [place] DSTR NROLYTC AGNT PARVERTEB FCT SNGL LMBR/SACRAL Left 7/21/2015    Procedure: NEURO DEST FACET L/S W/IG SNGL - L4-S3;  Surgeon: Luis Manuel Mayfield;  Location: [facility] ORS;  Service: Pain Management   • NJ DSTR NROLYTC AGNT PARVERTEB FCT ADDL LMBR/SACRAL  7/21/2015    Procedure: NEURO DEST FACET L/S W/IG ADDL;  Surgeon: Luis Manuel Mayfield;  Location: [facility] ORS;  Service: Pain Management   • PB INJECT RX OTHER PERIPH NERVE  7/21/2015    Procedure: NEUROLYTIC DEST-OTHER NERVE;  Surgeon: Luis Manuel Mayfield;  Location: SURGERY [facility];  Service: Pain Management   • PB INJECT RX OTHER PERIPH NERVE  7/21/2015    Procedure: NEUROLYTIC DEST-OTHER NERVE;  Surgeon: Luis Manuel Mayfield;  Location: SURGERY [facility];  Service: Pain Management   • PB INJECT RX OTHER PERIPH NERVE  7/21/2015    Procedure: NEUROLYTIC DEST-OTHER NERVE;  Surgeon: Luis Manuel Mayfield;  Location: [facility] ORS;  Service: Pain Management   • NEURO DEST FACET C/T W/IG SNGL  3/10/2015    Performed by Luis Manuel Mayfield at [facility]   • NEURO DEST FACET C/T W/IG ADDL  3/10/2015    Performed by Luis Manuel Mayfield at [facility]   • NEURO DEST FACET C/T W/IG ADDL  3/10/2015    Performed by Luis Manuel Mayfield at [facility]   • NEURO DEST  "FACET C/T W/IG ADDL  3/10/2015    Performed by Luis Manuel Mayfield at [facility]   • NEURO DEST FACET L/S W/IG SNGL  11/18/2014    Performed by Luis Manuel Mayfield at [facility] OR[facility]   • NEURO DEST FACET L/S W/IG ADDL  11/18/2014    Performed by Luis Manuel Mayfield at [facility]   • OTHER ORTHOPEDIC SURGERY      right hip surgery     SOCHX:  reports that she has quit smoking. She has never used smokeless tobacco. She reports that she does not drink alcohol or use drugs.  FH: Family history was reviewed, no pertinent findings to report  Medications, Allergies, and current problem list reviewed today in Epic       Objective:     /64   Pulse 72   Temp 36.6 °C (97.9 °F)   Resp 16   Ht 1.651 m (5' 5\")   Wt 64.9 kg (143 lb)   SpO2 97%   Breastfeeding? No   BMI 23.80 kg/m²      Physical Exam   Constitutional: She is oriented to person, place, and time. She appears well-developed and well-nourished. She is active.  Non-toxic appearance. She does not have a sickly appearance. She does not appear ill. No distress. She is not intubated.   HENT:   Head: Normocephalic and atraumatic.   Right Ear: Hearing, tympanic membrane, external ear and ear canal normal.   Left Ear: Hearing, tympanic membrane, external ear and ear canal normal.   Nose: Nose normal.   Mouth/Throat: Uvula is midline, oropharynx is clear and moist and mucous membranes are normal.   Eyes: Conjunctivae, EOM and lids are normal.   Neck: Normal range of motion and full passive range of motion without pain. Neck supple.   Cardiovascular: Regular rhythm, S1 normal, S2 normal and normal heart sounds.  Exam reveals no gallop and no friction rub.    No murmur heard.  Pulmonary/Chest: Effort normal and breath sounds normal. No accessory muscle usage. No apnea, no tachypnea and no bradypnea. She is not intubated. No respiratory distress. She has no decreased breath sounds. She has no wheezes. She has no rhonchi. She has no rales. She exhibits " no tenderness.   Musculoskeletal: Normal range of motion.   Neurological: She is alert and oriented to person, place, and time.   Skin: Skin is warm and dry.   Psychiatric: She has a normal mood and affect. Her speech is normal and behavior is normal. Judgment and thought content normal.   Vitals reviewed.              Assessment/Plan:   Discussed most likely viral uri.  Contingent antibiotic prescription given to patient to fill upon meeting criteria of guidelines discussed.     1. Upper respiratory tract infection, unspecified type    - amoxicillin-clavulanate (AUGMENTIN) 875-125 MG Tab; Take 1 Tab by mouth 2 times a day for 7 days.  Dispense: 14 Tab; Refill: 0  - benzonatate (TESSALON) 200 MG capsule; Take 1 Cap by mouth 3 times a day as needed for Cough.  Dispense: 30 Cap; Refill: 0    Differential diagnosis, natural history, supportive care discussed. Follow-up with primary care provider within 7-10 days, emergency room precautions discussed.  Patient and/or family appears understanding of information.  Handout and review of patients diagnosis and treatment was discussed extensively.

## 2018-08-23 ENCOUNTER — NON-PROVIDER VISIT (OUTPATIENT)
Dept: OCCUPATIONAL MEDICINE | Facility: CLINIC | Age: 53
End: 2018-08-23
Payer: MEDICARE

## 2018-08-23 DIAGNOSIS — Z11.1 ENCOUNTER FOR PPD TEST: ICD-10-CM

## 2018-08-23 PROCEDURE — 86580 TB INTRADERMAL TEST: CPT | Performed by: PREVENTIVE MEDICINE

## 2018-08-25 ENCOUNTER — NON-PROVIDER VISIT (OUTPATIENT)
Dept: URGENT CARE | Facility: CLINIC | Age: 53
End: 2018-08-25

## 2018-08-25 LAB — TB WHEAL 3D P 5 TU DIAM: NORMAL MM

## 2018-11-24 ENCOUNTER — HOSPITAL ENCOUNTER (EMERGENCY)
Facility: MEDICAL CENTER | Age: 53
End: 2018-11-24
Attending: EMERGENCY MEDICINE
Payer: MEDICARE

## 2018-11-24 VITALS
RESPIRATION RATE: 16 BRPM | HEART RATE: 80 BPM | WEIGHT: 145 LBS | OXYGEN SATURATION: 98 % | SYSTOLIC BLOOD PRESSURE: 137 MMHG | HEIGHT: 65 IN | TEMPERATURE: 98.4 F | DIASTOLIC BLOOD PRESSURE: 80 MMHG | BODY MASS INDEX: 24.16 KG/M2

## 2018-11-24 DIAGNOSIS — G89.29 ACUTE EXACERBATION OF CHRONIC LOW BACK PAIN: ICD-10-CM

## 2018-11-24 DIAGNOSIS — M54.50 ACUTE EXACERBATION OF CHRONIC LOW BACK PAIN: ICD-10-CM

## 2018-11-24 PROCEDURE — 96374 THER/PROPH/DIAG INJ IV PUSH: CPT

## 2018-11-24 PROCEDURE — 99284 EMERGENCY DEPT VISIT MOD MDM: CPT

## 2018-11-24 PROCEDURE — 700111 HCHG RX REV CODE 636 W/ 250 OVERRIDE (IP): Performed by: EMERGENCY MEDICINE

## 2018-11-24 RX ORDER — ONDANSETRON 4 MG/1
4 TABLET, ORALLY DISINTEGRATING ORAL EVERY 6 HOURS PRN
Qty: 20 TAB | Refills: 0 | Status: SHIPPED | OUTPATIENT
Start: 2018-11-24

## 2018-11-24 RX ORDER — HYDROCODONE BITARTRATE AND ACETAMINOPHEN 5; 325 MG/1; MG/1
1-2 TABLET ORAL EVERY 6 HOURS PRN
Qty: 15 TAB | Refills: 0 | Status: SHIPPED | OUTPATIENT
Start: 2018-11-24 | End: 2018-11-27

## 2018-11-24 RX ORDER — HYDROMORPHONE HYDROCHLORIDE 1 MG/ML
1 INJECTION, SOLUTION INTRAMUSCULAR; INTRAVENOUS; SUBCUTANEOUS ONCE
Status: COMPLETED | OUTPATIENT
Start: 2018-11-24 | End: 2018-11-24

## 2018-11-24 RX ADMIN — HYDROMORPHONE HYDROCHLORIDE 1 MG: 1 INJECTION, SOLUTION INTRAMUSCULAR; INTRAVENOUS; SUBCUTANEOUS at 13:30

## 2018-11-24 ASSESSMENT — LIFESTYLE VARIABLES: DO YOU DRINK ALCOHOL: NO

## 2018-11-24 ASSESSMENT — PAIN SCALES - GENERAL: PAINLEVEL_OUTOF10: 3

## 2018-11-24 NOTE — ED PROVIDER NOTES
ED Provider Note      ER PROVIDER NOTE        CHIEF COMPLAINT  Chief Complaint   Patient presents with   • Back Pain     pt reports sudden sharp low back pain yesterday after bending over. pt has chronic back pain/ pt reports internal stimulator for back pain       HPI  Enrique Briones is a 53 y.o. female who presents to the emergency department complaining of low back pain.  Patient reports that yesterday she was bending over she felt something stretch in her back and has had pain since.  She has a history of chronic low back pain and has a stimulator in place.  She denies any focal weakness numbness or tingling.  No bowel or bladder incontinence.  No fevers or chills.  No history of immunocompromise    REVIEW OF SYSTEMS  Pertinent positives include back pain. Pertinent negatives include no weakness or numbness. See HPI for details. All other systems reviewed and are negative.    PAST MEDICAL HISTORY   has a past medical history of Fibromyalgia; Headache(784.0); Migraine; and Seizure (HCC).    SURGICAL HISTORY   has a past surgical history that includes neuro dest facet l/s w/ig sngl (11/18/2014); neuro dest facet l/s w/ig addl (11/18/2014); other orthopedic surgery; neuro dest facet c/t w/ig sngl (3/10/2015); neuro dest facet c/t w/ig addl (3/10/2015); neuro dest facet c/t w/ig addl (3/10/2015); neuro dest facet c/t w/ig addl (3/10/2015); dstr nrolytc agnt parverteb fct sngl lmbr/sacral (Left, 7/21/2015); dstr nrolytc agnt parverteb fct addl lmbr/sacral (7/21/2015); inject rx other periph nerve (7/21/2015); inject rx other periph nerve (7/21/2015); and inject rx other periph nerve (7/21/2015).    FAMILY HISTORY  No family history on file.    SOCIAL HISTORY  Social History     Social History   • Marital status: Single     Spouse name: N/A   • Number of children: N/A   • Years of education: N/A     Social History Main Topics   • Smoking status: Former Smoker   • Smokeless tobacco: Never Used   • Alcohol  "use No   • Drug use: No   • Sexual activity: Yes     Partners: Female     Other Topics Concern   • Not on file     Social History Narrative   • No narrative on file      History   Drug Use No       CURRENT MEDICATIONS  Home Medications    **Home medications have not yet been reviewed for this encounter**         ALLERGIES  Allergies   Allergen Reactions   • Codeine Vomiting   • Aspirin Nausea   • Latex Itching       PHYSICAL EXAM  VITAL SIGNS: /72   Pulse 72   Temp 36.9 °C (98.4 °F) (Temporal)   Resp 16   Ht 1.651 m (5' 5\")   Wt 65.8 kg (145 lb)   BMI 24.13 kg/m²   Pulse ox interpretation:I interpret this pulse ox as normal.    Constitutional: Alert in no apparent distress.  HENT: No signs of trauma, Bilateral external ears normal, Nose normal.   Eyes: Pupils are equal and reactive, Conjunctiva normal, Non-icteric.   Neck: Normal range of motion, No tenderness, Supple, No stridor.   Lymphatic: No lymphadenopathy noted.   Cardiovascular: Regular rate and rhythm, no murmurs.   Thorax & Lungs: Normal breath sounds, No respiratory distress, No wheezing, No chest tenderness.   Abdomen: Bowel sounds normal, Soft, No tenderness, No masses, No pulsatile masses. No peritoneal signs.  Skin: Warm, Dry, No erythema, No rash.   Back: No bony tenderness, No CVA tenderness.   Extremities: Intact distal pulses, No edema, No tenderness, No cyanosis, Negative Linda's sign.  Musculoskeletal: Good range of motion in all major joints. No tenderness to palpation or major deformities noted.   Neurologic: Alert, cranial nerves intact, speech is appropriate or not slurred, upper extremities bilaterally exhibit no drift, no dysmetria, 5 out of 5 strength with bilateral bicep/tricep/, sensation intact to light touch throughout upper extremities. Lower extremities strength 5 out of 5 thigh extension/flexion/abduction/adduction, knee extension/flexion, dorsiflexion plantar flexion. No clonus.  2+ patella reflexes.  sensation " intact to light touch.  No focal deficits noted. Ambulates with steady gait, steady tandem gait  Psychiatric: Affect normal, Judgment normal, Mood normal.       DIAGNOSTIC STUDIES / PROCEDURES      RADIOLOGY  No orders to display     The radiologist's interpretation of all radiological studies have been reviewed by me.    COURSE & MEDICAL DECISION MAKING  Nursing notes, VS, PMSFHx reviewed in chart.    1:24 PM Patient seen and examined at bedside. Patient will be treated with hydromorphone.    3:02 PM patient's pain is much better, ambulates well, will plan on discharge        Decision Making:  This is a 53 y.o. female present with low back pain.  This appears to be acute exacerbation of her chronic pain.   No focal neuro sx, saddle anesthesia, bowel/bladder sx to suggest cord compromise, cauda equina or spinal compression syndrome, afebrile and no systemic infx sx or immunocompromise or significant midline tenderness to suggest focal infection such as discitis or abscess, no new trauma or midline tenderness to suggest fracture, no flank or abdominal pain or urinary sx to suggest renal or abdominal pathology such as AAA.  While her device may not be functioning appropriately does not appear to represent a dangerous condition at this point and she will follow-up with her pain management doctor on Monday to address the functioning of her device  Pt dc to home with outpt fu. Strict return precautions given regarding new neuro sx, urinary sx, pain, fever, etc. Pt understood well            I reviewed prescription monitoring program for patient's narcotic use before prescribing a scheduled drug.The patient will not drink alcohol nor drive with prescribed medications. The patient will return for new or worsening symptoms and is stable at the time of discharge.    The patient is referred to a primary physician for blood pressure management, diabetic screening, and for all other preventative health concerns.    In  prescribing controlled substances to this patient, I certify that I have obtained and reviewed the medical history of Enrique Briones. I have also made a good princess effort to obtain applicable records from other providers who have treated the patient and records did not demonstrate any increased risk of substance abuse that would prevent me from prescribing controlled substances.     I have conducted a physical exam and documented it. I have reviewed Ms. Briones’s prescription history as maintained by the [place] Prescription Monitoring Program.     I have assessed the patient’s risk for abuse, dependency, and addiction using the validated Opioid Risk Tool available at https://www.mdcalc.com/gzgast-kbse-smss-ort-narcotic-abuse.     Given the above, I believe the benefits of controlled substance therapy outweigh the risks. The reasons for prescribing controlled substances include non-narcotic, oral analgesic alternatives have been inadequate for pain control. Accordingly, I have discussed the risk and benefits, treatment plan, and alternative therapies with the patient.         DISPOSITION:  Patient will be discharged home in stable condition.    FOLLOW UP:  Mohsen Tamasaby, M.D.  [address] [place] [id number] [phone]    In 3 days        OUTPATIENT MEDICATIONS:  New Prescriptions    HYDROCODONE-ACETAMINOPHEN (NORCO) 5-325 MG TAB PER TABLET    Take 1-2 Tabs by mouth every 6 hours as needed for up to 3 days.         FINAL IMPRESSION  1. Acute exacerbation of chronic low back pain         The note accurately reflects work and decisions made by me.  Easton Mims  11/24/2018  3:32 PM

## 2018-11-24 NOTE — ED TRIAGE NOTES
Chief Complaint   Patient presents with   • Back Pain     pt reports sudden sharp low back pain yesterday after bending over. pt has chronic back pain/ pt reports internal stimulator for back pain     ERP at bedside.

## 2018-11-24 NOTE — ED NOTES
Pt given discharge instructions and prescriptions. Pt verbalized understanding. RN to answer any questions pt had. Pt instructed not to drive after receiving narcotics and knows about follow up care with pain doctor. VSS. Pt wheeled out to front lobby.

## 2018-11-24 NOTE — ED NOTES
"Patient able to up self walk to restroom and back to [place] [facility] with steady gate. She stated \"pain is mild, that pain med really helped\". Chart up for rechecked, vital signs rechecked.   "

## 2019-02-20 ENCOUNTER — OFFICE VISIT (OUTPATIENT)
Dept: NEUROLOGY | Facility: MEDICAL CENTER | Age: 54
End: 2019-02-20
Payer: MEDICARE

## 2019-02-20 VITALS
SYSTOLIC BLOOD PRESSURE: 122 MMHG | HEIGHT: 65 IN | HEART RATE: 83 BPM | TEMPERATURE: 97.3 F | OXYGEN SATURATION: 94 % | WEIGHT: 143.6 LBS | DIASTOLIC BLOOD PRESSURE: 64 MMHG | BODY MASS INDEX: 23.93 KG/M2

## 2019-02-20 DIAGNOSIS — G43.019 MIGRAINE WITHOUT AURA, INTRACTABLE: ICD-10-CM

## 2019-02-20 DIAGNOSIS — H93.13 TINNITUS OF BOTH EARS: ICD-10-CM

## 2019-02-20 PROCEDURE — 99203 OFFICE O/P NEW LOW 30 MIN: CPT | Performed by: NURSE PRACTITIONER

## 2019-02-20 RX ORDER — TOPIRAMATE 100 MG/1
100 CAPSULE, EXTENDED RELEASE ORAL 2 TIMES DAILY
Qty: 60 EACH | Refills: 5 | Status: SHIPPED | OUTPATIENT
Start: 2019-02-20 | End: 2019-09-03

## 2019-02-20 ASSESSMENT — ENCOUNTER SYMPTOMS
NERVOUS/ANXIOUS: 0
ABDOMINAL PAIN: 0
VOMITING: 0
COUGH: 0
DIARRHEA: 0
DOUBLE VISION: 0
MUSCULOSKELETAL NEGATIVE: 1
NAUSEA: 0
SEIZURES: 0
SORE THROAT: 0
DEPRESSION: 0
CONSTITUTIONAL NEGATIVE: 1
HEADACHES: 1
MEMORY LOSS: 1

## 2019-02-20 ASSESSMENT — PATIENT HEALTH QUESTIONNAIRE - PHQ9: CLINICAL INTERPRETATION OF PHQ2 SCORE: 0

## 2019-02-20 NOTE — PROGRESS NOTES
Subjective:      Enrique Briones is a 53 y.o. female who presents with [facility] Care (Conversion disorder with seizures or convulsions)          HPI  Has been seen per Dr Bloch in 2013, nearly 67 years ago.    Has been seen per neuro-surgeon here in [place]-- unknown physician name.    [facility] Brain MRI: arachnoid cyst adjacent to the right frontal lobe measuring 4 X 0.9cm.    Feels sick every time she takes medication.  Drinks a breakfast drink with her morning medication.  Has needed nausea medication for months now.      Headaches:  Followed per Dr Peres, pain management.  She does have a spinal stimulator for pain. Does have have ONB's.  She had one Botox injection set for migraine-- did not like the effect of her forehead being frozen.    Describes a bilateral ear ringing and a feeling of dizziness as well.    Has not seen an ENT.    Very prone to nausea with her medication.    Lives in [place], [place].  Works part-time.  There is a mix-up with her medicaid and SSI which she claims she did not know that she had to report any income so her medicaid has been stopped and her wages are being garnished.    Current Outpatient Prescriptions   Medication Sig Dispense Refill   • ondansetron (ZOFRAN ODT) 4 MG TABLET DISPERSIBLE Take 1 Tab by mouth every 6 hours as needed. 20 Tab 0   • gabapentin (NEURONTIN) 600 MG tablet Take 600 mg by mouth.     • pantoprazole (PROTONIX) 20 MG tablet Take 20 mg by mouth every day.     • conjugated estrogen (PREMARIN) 0.625 MG Tab Take 0.625 mg by mouth every day.     • vitamin D, Ergocalciferol, (DRISDOL) 27930 UNITS CAPS capsule Take 50,000 Units by mouth every 14 days.     • Multiple Vitamins-Minerals (MULTI VITAMIN/MINERALS PO) Take  by mouth.     • divalproex EC (DEPAKOTE) 500 MG TBEC Take 500 mg by mouth 3 times a day.     • topiramate (TOPAMAX) 100 MG TABS Take 100 mg by mouth 2 times a day.     • duloxetine (CYMBALTA) 60 MG CPEP Take 60 mg by mouth 2 times a day.     •  "benzonatate (TESSALON) 200 MG capsule Take 1 Cap by mouth 3 times a day as needed for Cough. (Patient not taking: Reported on 2/20/2019) 30 Cap 0   • cyclobenzaprine (FLEXERIL) 10 MG Tab Take 10 mg by mouth 3 times a day as needed.     • alprazolam (XANAX) 0.5 MG Tab Take 0.5 mg by mouth at bedtime as needed for Sleep.     • alprazolam (XANAX) 1 MG TABS Take 2 mg by mouth at bedtime as needed.       No current facility-administered medications for this visit.          Review of Systems   Constitutional: Negative.    HENT: Negative for hearing loss, nosebleeds and sore throat.         No recent head injury.   Eyes: Negative for double vision.        No new loss of vision.   Respiratory: Negative for cough.         No recent lung infections.   Cardiovascular: Negative for chest pain.   Gastrointestinal: Negative for abdominal pain, diarrhea, nausea and vomiting.   Genitourinary: Negative.    Musculoskeletal: Negative.    Skin: Negative.    Neurological: Positive for headaches. Negative for seizures.   Endo/Heme/Allergies:        No history of endocrine dysfunction.  No new problems.   Psychiatric/Behavioral: Positive for memory loss. Negative for depression. The patient is not nervous/anxious.         No recent mood changes.          Objective:     /64   Pulse 83   Temp 36.3 °C (97.3 °F) (Temporal)   Ht 1.651 m (5' 5\")   Wt 65.1 kg (143 lb 9.6 oz)   SpO2 94%   BMI 23.90 kg/m²      Physical Exam   Constitutional: She is oriented to person, place, and time. She appears well-developed and well-nourished. No distress.   HENT:   Head: Normocephalic and atraumatic.   Nose: Nose normal.   Eyes: Pupils are equal, round, and reactive to light.   Cardiovascular: Normal rate and regular rhythm.  Exam reveals no gallop and no friction rub.    No murmur heard.  Pulmonary/Chest: Effort normal and breath sounds normal. No respiratory distress.   Lymphadenopathy:     She has no cervical adenopathy.   Neurological: She is " alert and oriented to person, place, and time. Gait normal.   CN II: Fundi normal, visual fields full to confrontation.  CN III, IV, VI: Pupils equal, round, and reactive to light.  Extraocular movements full and intact in horizontal and vertical gaze.  CN V: Normal in motor and sensory modalities.  CN VII: No evidence of facial asymmetry.  CN VIII: Hearing grossly intact.  CN IX, X: Palate elevates symmetrically and in the midline.  CN XI: Normal sternocleidomastoid strength.  CN XII: Tongue is in the midline.    Motor: Normal muscle bulk and tone, with full and symmetric strength.  Sensory: Intact to light touch, pinprick, vibration, proprioception, and graphesthesia.  DTR's: 2+ throughout with flexor plantar responses.  Cerebellar/Coordination: Normal finger to finger, finger-tapping, rapid alternating movements, and foot tapping.  Gait: Normal casual gait.  Walks well on heels and toes, as well as in tandem gait.   Skin: Skin is warm and dry.   Psychiatric: She has a normal mood and affect.             Assessment/Plan:     Chronic Migraine:  Headache days are 5-8 per month.  Describes an intermittent bilateral ear ringing and feeling of dizziness with her headaches.  Consistently feels nauseated.    Discussed the usage of topiramate molecule.  She would like to first trial the extended release option.  Will transition from Topiramate 100mg BID to Qudexy 100mg BID.    She will continue to take Depakote, Neurontin, and Cymbalta per PCP and pain management.    Obtain Brain MRI through [facility] in 2-3 years, 2021.    Will call if she would like to trial a different care plan for her migraine treatment.    Tinnitus: Recommend that she follows-up with ENT-- referral for consult placed.    Return for follow-up within one year or may just continue care with PCP.

## 2019-04-24 ENCOUNTER — OFFICE VISIT (OUTPATIENT)
Dept: NEUROLOGY | Facility: MEDICAL CENTER | Age: 54
End: 2019-04-24
Payer: MEDICARE

## 2019-04-24 VITALS
HEIGHT: 65 IN | SYSTOLIC BLOOD PRESSURE: 122 MMHG | HEART RATE: 81 BPM | OXYGEN SATURATION: 98 % | RESPIRATION RATE: 16 BRPM | DIASTOLIC BLOOD PRESSURE: 76 MMHG | BODY MASS INDEX: 24.49 KG/M2 | TEMPERATURE: 97.6 F | WEIGHT: 147 LBS

## 2019-04-24 DIAGNOSIS — F44.5 CONVERSION DISORDER WITH SEIZURES OR CONVULSIONS: ICD-10-CM

## 2019-04-24 DIAGNOSIS — G43.019 MIGRAINE WITHOUT AURA, INTRACTABLE: ICD-10-CM

## 2019-04-24 PROCEDURE — 99213 OFFICE O/P EST LOW 20 MIN: CPT | Performed by: NURSE PRACTITIONER

## 2019-04-24 RX ORDER — ALPRAZOLAM 0.5 MG/1
0.5 TABLET ORAL NIGHTLY PRN
COMMUNITY

## 2019-04-24 NOTE — PROGRESS NOTES
Subjective:      Enrique Briones is a 53 y.o. female who presents with Follow-Up (Migraine without aura, intractable)            HPI  Here today for a DMV form.    Last event on record was January 2016.    Followed by Dr Bloch in the past for neurology care.    Headaches:  Followed per Dr Peres, pain management.  She does have a spinal stimulator for pain. Does have have ONB's.  She had one Botox injection set for migraine-- did not like the effect of her forehead being frozen.    Current Outpatient Prescriptions   Medication Sig Dispense Refill   • ALPRAZolam (XANAX) 0.5 MG Tab Take 0.5 mg by mouth at bedtime as needed for Sleep.     • ondansetron (ZOFRAN ODT) 4 MG TABLET DISPERSIBLE Take 1 Tab by mouth every 6 hours as needed. 20 Tab 0   • gabapentin (NEURONTIN) 600 MG tablet Take 600 mg by mouth.     • pantoprazole (PROTONIX) 20 MG tablet Take 20 mg by mouth every day.     • conjugated estrogen (PREMARIN) 0.625 MG Tab Take 0.625 mg by mouth every day.     • vitamin D, Ergocalciferol, (DRISDOL) 88379 UNITS CAPS capsule Take 50,000 Units by mouth every 14 days.     • Multiple Vitamins-Minerals (MULTI VITAMIN/MINERALS PO) Take  by mouth.     • divalproex EC (DEPAKOTE) 500 MG TBEC Take 500 mg by mouth 3 times a day.     • topiramate (TOPAMAX) 100 MG TABS Take 100 mg by mouth 2 times a day.     • duloxetine (CYMBALTA) 60 MG CPEP Take 60 mg by mouth 2 times a day.     • Topiramate ER (QUDEXY XR) 100 MG Capsule ER 24 Hour Sprinkle Take 100 mg by mouth 2 Times a Day. (Patient not taking: Reported on 4/24/2019) 60 Each 5     No current facility-administered medications for this visit.        Review of Systems   Constitutional: Negative.    HENT: Negative for hearing loss, nosebleeds and sore throat.         No recent head injury.   Eyes: Negative for double vision.        No new loss of vision.   Respiratory: Negative for cough.         No recent lung infections.   Cardiovascular: Negative for chest pain.  "  Gastrointestinal: Negative for abdominal pain, diarrhea, nausea and vomiting.   Genitourinary: Negative.    Musculoskeletal: Negative.    Skin: Negative.    Neurological: Positive for headaches.   Endo/Heme/Allergies:        No history of endocrine dysfunction.  No new problems.   Psychiatric/Behavioral: Negative for depression. The patient is not nervous/anxious.         No recent mood changes.          Objective:     /76 (BP Location: Left arm, Patient Position: Sitting, BP Cuff Size: Adult)   Pulse 81   Temp 36.4 °C (97.6 °F) (Temporal)   Resp 16   Ht 1.651 m (5' 5\")   Wt 66.7 kg (147 lb)   SpO2 98%   BMI 24.46 kg/m²      Physical Exam   Constitutional: She is oriented to person, place, and time. She appears well-developed and well-nourished.   HENT:   Head: Normocephalic and atraumatic.   Eyes: EOM are normal.   Neck: Normal range of motion.   Cardiovascular: Normal rate and regular rhythm.    Pulmonary/Chest: Effort normal.   Musculoskeletal: Normal range of motion.   Neurological: She is alert and oriented to person, place, and time. She exhibits normal muscle tone. Gait normal.   No observable changes in neurologic status.  See initial new patient examination for details.    Skin: Skin is warm.   Psychiatric: She has a normal mood and affect.               Assessment/Plan:     Chronic Migraine:    Last event of loss of awareness/consciousness on record was January 2016.    Will continue Topamax 100mg BID as well as Depakote and Cymbalta per pain management team.    DMV form completed today.    Return for follow-up as needed.        "

## 2019-04-29 ASSESSMENT — ENCOUNTER SYMPTOMS
DOUBLE VISION: 0
ABDOMINAL PAIN: 0
NAUSEA: 0
NERVOUS/ANXIOUS: 0
DEPRESSION: 0
HEADACHES: 1
VOMITING: 0
CONSTITUTIONAL NEGATIVE: 1
MUSCULOSKELETAL NEGATIVE: 1
DIARRHEA: 0
COUGH: 0
SORE THROAT: 0

## 2019-05-15 ENCOUNTER — HOSPITAL ENCOUNTER (OUTPATIENT)
Dept: PAIN MANAGEMENT | Facility: REHABILITATION | Age: 54
End: 2019-05-15
Attending: PAIN MEDICINE
Payer: MEDICARE

## 2019-05-15 ENCOUNTER — HOSPITAL ENCOUNTER (OUTPATIENT)
Dept: RADIOLOGY | Facility: REHABILITATION | Age: 54
End: 2019-05-15
Attending: PAIN MEDICINE

## 2019-05-15 VITALS
OXYGEN SATURATION: 99 % | HEART RATE: 84 BPM | SYSTOLIC BLOOD PRESSURE: 121 MMHG | RESPIRATION RATE: 15 BRPM | HEIGHT: 65 IN | TEMPERATURE: 98.4 F | BODY MASS INDEX: 24.68 KG/M2 | WEIGHT: 148.15 LBS | DIASTOLIC BLOOD PRESSURE: 80 MMHG

## 2019-05-15 PROCEDURE — 700111 HCHG RX REV CODE 636 W/ 250 OVERRIDE (IP)

## 2019-05-15 PROCEDURE — 64636 DESTROY L/S FACET JNT ADDL: CPT

## 2019-05-15 PROCEDURE — 99152 MOD SED SAME PHYS/QHP 5/>YRS: CPT

## 2019-05-15 PROCEDURE — 64635 DESTROY LUMB/SAC FACET JNT: CPT

## 2019-05-15 PROCEDURE — 99153 MOD SED SAME PHYS/QHP EA: CPT

## 2019-05-15 RX ORDER — LIDOCAINE HYDROCHLORIDE 10 MG/ML
INJECTION, SOLUTION EPIDURAL; INFILTRATION; INTRACAUDAL; PERINEURAL
Status: COMPLETED
Start: 2019-05-15 | End: 2019-05-15

## 2019-05-15 RX ORDER — TRIAMCINOLONE ACETONIDE 40 MG/ML
INJECTION, SUSPENSION INTRA-ARTICULAR; INTRAMUSCULAR
Status: COMPLETED
Start: 2019-05-15 | End: 2019-05-15

## 2019-05-15 RX ORDER — BUPIVACAINE HYDROCHLORIDE 2.5 MG/ML
INJECTION, SOLUTION EPIDURAL; INFILTRATION; INTRACAUDAL
Status: COMPLETED
Start: 2019-05-15 | End: 2019-05-15

## 2019-05-15 RX ORDER — MIDAZOLAM HYDROCHLORIDE 1 MG/ML
INJECTION INTRAMUSCULAR; INTRAVENOUS
Status: COMPLETED
Start: 2019-05-15 | End: 2019-05-15

## 2019-05-15 RX ADMIN — MIDAZOLAM HYDROCHLORIDE 1 MG: 1 INJECTION, SOLUTION INTRAMUSCULAR; INTRAVENOUS at 13:31

## 2019-05-15 RX ADMIN — MIDAZOLAM HYDROCHLORIDE 2 MG: 1 INJECTION, SOLUTION INTRAMUSCULAR; INTRAVENOUS at 13:27

## 2019-05-15 RX ADMIN — TRIAMCINOLONE ACETONIDE 40 MG: 40 INJECTION, SUSPENSION INTRA-ARTICULAR; INTRAMUSCULAR at 13:35

## 2019-05-15 RX ADMIN — BUPIVACAINE HYDROCHLORIDE 5 ML: 2.5 INJECTION, SOLUTION EPIDURAL; INFILTRATION; INTRACAUDAL; PERINEURAL at 13:35

## 2019-05-15 RX ADMIN — FENTANYL CITRATE 50 MCG: 50 INJECTION, SOLUTION INTRAMUSCULAR; INTRAVENOUS at 13:34

## 2019-05-15 RX ADMIN — LIDOCAINE HYDROCHLORIDE 30 ML: 10 INJECTION, SOLUTION EPIDURAL; INFILTRATION; INTRACAUDAL; PERINEURAL at 13:35

## 2019-05-15 RX ADMIN — FENTANYL CITRATE 50 MCG: 50 INJECTION, SOLUTION INTRAMUSCULAR; INTRAVENOUS at 13:30

## 2019-05-15 NOTE — NON-PROVIDER
Denied taking any blood thinners and  any anti- inflammatories medications. Home care education and verbal instruction given to patient and verbalized understanding.Patient had a  ( friend ).Patient had history of Seizures and stimulator. Stop bang score # 1 . Dr. Mayfield made aware. Hand off reported to MORTEZA Staples RN.

## 2019-05-15 NOTE — NON-PROVIDER
Patient assisted onto procedure table and helped into position with adequate padding to protect joints;connected to necessary monitoring equipment, all with assistance from Patient Care Tech and Radiology Tech and RN. Pain stimulator turned off by patient using her control device

## 2019-05-15 NOTE — NON-PROVIDER
1314 PM    .   Tolerated fluids well.  Ice pack applied to affected area. Patient able to  move all extremities without difficulty voluntarily and on command. Reviewed home care instruction and understood by patient.

## 2019-05-20 NOTE — PROCEDURES
LUMBAR RF NEUROTOMY    ATTENDING:  Luis Manuel Mayfield MD    ASSISTANT:  None    PREOPERATIVE DIAGNOSIS:  Lumbar spondylosis with lumbar facet arthropathy.    POSTOPERATIVE DIAGNOSIS:  Lumbar spondylosis with lumbar facet arthropathy.    PROCEDURE PERFORMED:  1. Radiofrequency ablation of the L2 medial branch nerve on the left side.  2. Additional levels L3, L4 and L5 on the left  3. Fluoroscopy for precise needle placement.    ANESTHESIA:  Local infiltration with lidocaine and conscious sedation   [facility]   Anesthesia time: 22 minuets    MONITORS:   Automatic blood pressure cuff and pulse oximetry.    INDICATIONS:  + 2 successful block,   MEDICATIONS:    (Please note that the patient's anticoagulant and/or aspirin were held appropriately)      REVIEW OF SYSTEMS:  Negative for fever, chills, chest pain, SOB, bleeding abnormalities, nausea, vomiting, diarrhea, worsening edema, or new rashes.    FOCUSED PHYSICAL EXAMINATION:  The patient is awake, alert and oriented, and is in no acute distress.  Vital signs are stable.  The patient is afebrile.  The rest of the PE is essentially unchanged from the patient's recent visit to our office.    We explained the procedure to the patient including the risks, benefits and alternatives to the procedure.  The patient verbalized understanding and was willing to proceed.    PROCEDURE IN DETAIL:  An informed consent was obtained.  The patient was taken to the procedure room and was positively identified by the staff and the attending physician.  The patient was positioned prone on the procedure bed.  Vital signs were monitored as above and remained stable throughout the procedure.  The skin was prepped and draped in the standard sterile fashion.  A surgical pause (time-out) was performed and was agreed upon by the members of the team. A fluoroscopic view of the lumbar spine was obtained, and the area of interest was identified.  The skin was anesthetized using 1% lidocaine and  25-gauge 1-1/2-inch needle.    After that, a 16-gauge 15-cm conventional RF cannula with 10-mm active tip was advanced onto the junction between the superior articular process and the transverse process of L3 on the left side.  Full contact with the bone was established.  Additional RF cannulae were placed at L4, L5 and Juncture of Superior articular process of S1 and Sacral Ala.on the left.  (Each level was stimulated for sensory fibers at 50 Hz, and the patient's pain was reproduced at approximately 0.8 V).  (The motor fiber recruitment was ruled out by stimulation at 2 Hz in the range between 1.5 to 2.5 V).  (Only localized twitching of the multifidus muscle was elicited.)  Prior to lesioning, each nerve was anesthetized with 0.5 mL of lidocaine 1%.  At each level, we then performed one lesion at 90 degrees centigrade, 105 seconds in duration.  Before removing the radiofrequency cannulae, each site was injected with 1 ml of the mixture containing 4 ml Bupivacaine 0.25% and 1 ml Kenalog 40mg/ml.    All needles were withdrawn.  The patient tolerated the procedure well.  The patient was transferred in stable condition to the recovery room.    COMPLICATIONS:  None.    PAIN SCORE BEFORE THE PROCEDURE:   PAIN SCORE AFTER THE PROCEDURE:   DISPOSITON:  1. Discharge to home when the discharge criteria are met.  2. Follow up in two weeks in the Pain Clinic.  3. (The patient will resume their medications

## 2019-05-31 ENCOUNTER — TELEPHONE (OUTPATIENT)
Dept: NEUROLOGY | Facility: MEDICAL CENTER | Age: 54
End: 2019-05-31

## 2019-05-31 NOTE — TELEPHONE ENCOUNTER
Tried to submit PA but ID number is not matching system. Looked in media no copy of rx card. Called pt and lvm asking to confirm pharmacy benefits and for updated ID number.

## 2019-08-07 ENCOUNTER — TELEPHONE (OUTPATIENT)
Dept: NEUROLOGY | Facility: MEDICAL CENTER | Age: 54
End: 2019-08-07

## 2019-08-07 NOTE — TELEPHONE ENCOUNTER
Patient called and stated that the DMV form that was done in May needed to be faxed to the DMV in [place] so she can get the lock lifted off her license to enable her to change her address here.     Patient also stated that the birthday on the form was the [birth year]th and needed to be changed to the 16th.     Form printed from media and will be faxed to the [place] DMV at the following fax number provided by the patient:  [phone]

## 2019-09-03 DIAGNOSIS — Z01.812 PRE-OPERATIVE LABORATORY EXAMINATION: ICD-10-CM

## 2019-09-03 LAB
ANION GAP SERPL CALC-SCNC: 8 MMOL/L (ref 0–11.9)
BUN SERPL-MCNC: 14 MG/DL (ref 8–22)
CALCIUM SERPL-MCNC: 10 MG/DL (ref 8.5–10.5)
CHLORIDE SERPL-SCNC: 106 MMOL/L (ref 96–112)
CO2 SERPL-SCNC: 25 MMOL/L (ref 20–33)
CREAT SERPL-MCNC: 0.7 MG/DL (ref 0.5–1.4)
ERYTHROCYTE [DISTWIDTH] IN BLOOD BY AUTOMATED COUNT: 41.8 FL (ref 35.9–50)
GLUCOSE SERPL-MCNC: 88 MG/DL (ref 65–99)
HCT VFR BLD AUTO: 44.5 % (ref 37–47)
HGB BLD-MCNC: 14.7 G/DL (ref 12–16)
MCH RBC QN AUTO: 31.7 PG (ref 27–33)
MCHC RBC AUTO-ENTMCNC: 33 G/DL (ref 33.6–35)
MCV RBC AUTO: 96.1 FL (ref 81.4–97.8)
PLATELET # BLD AUTO: 257 K/UL (ref 164–446)
PMV BLD AUTO: 10.4 FL (ref 9–12.9)
POTASSIUM SERPL-SCNC: 4.2 MMOL/L (ref 3.6–5.5)
RBC # BLD AUTO: 4.63 M/UL (ref 4.2–5.4)
SODIUM SERPL-SCNC: 139 MMOL/L (ref 135–145)
WBC # BLD AUTO: 5.5 K/UL (ref 4.8–10.8)

## 2019-09-03 PROCEDURE — 36415 COLL VENOUS BLD VENIPUNCTURE: CPT

## 2019-09-03 PROCEDURE — 85027 COMPLETE CBC AUTOMATED: CPT

## 2019-09-03 PROCEDURE — 80048 BASIC METABOLIC PNL TOTAL CA: CPT

## 2019-09-06 ENCOUNTER — HOSPITAL ENCOUNTER (OUTPATIENT)
Facility: MEDICAL CENTER | Age: 54
End: 2019-09-06
Attending: SURGERY | Admitting: SURGERY
Payer: MEDICARE

## 2019-09-06 ENCOUNTER — ANESTHESIA (OUTPATIENT)
Dept: SURGERY | Facility: MEDICAL CENTER | Age: 54
End: 2019-09-06
Payer: MEDICARE

## 2019-09-06 ENCOUNTER — ANESTHESIA EVENT (OUTPATIENT)
Dept: SURGERY | Facility: MEDICAL CENTER | Age: 54
End: 2019-09-06
Payer: MEDICARE

## 2019-09-06 VITALS
WEIGHT: 137.57 LBS | OXYGEN SATURATION: 98 % | SYSTOLIC BLOOD PRESSURE: 102 MMHG | HEART RATE: 64 BPM | TEMPERATURE: 98 F | DIASTOLIC BLOOD PRESSURE: 57 MMHG | RESPIRATION RATE: 16 BRPM | HEIGHT: 65 IN | BODY MASS INDEX: 22.92 KG/M2

## 2019-09-06 DIAGNOSIS — K82.8 BILIARY DYSKINESIA: ICD-10-CM

## 2019-09-06 PROCEDURE — 700111 HCHG RX REV CODE 636 W/ 250 OVERRIDE (IP): Performed by: ANESTHESIOLOGY

## 2019-09-06 PROCEDURE — 160047 HCHG PACU  - EA ADDL 30 MINS PHASE II: Performed by: SURGERY

## 2019-09-06 PROCEDURE — 500868 HCHG NEEDLE, SURGI(VARES): Performed by: SURGERY

## 2019-09-06 PROCEDURE — A9270 NON-COVERED ITEM OR SERVICE: HCPCS | Performed by: ANESTHESIOLOGY

## 2019-09-06 PROCEDURE — 160035 HCHG PACU - 1ST 60 MINS PHASE I: Performed by: SURGERY

## 2019-09-06 PROCEDURE — 160009 HCHG ANES TIME/MIN: Performed by: SURGERY

## 2019-09-06 PROCEDURE — 160046 HCHG PACU - 1ST 60 MINS PHASE II: Performed by: SURGERY

## 2019-09-06 PROCEDURE — 700101 HCHG RX REV CODE 250: Performed by: SURGERY

## 2019-09-06 PROCEDURE — 88304 TISSUE EXAM BY PATHOLOGIST: CPT

## 2019-09-06 PROCEDURE — 502571 HCHG PACK, LAP CHOLE: Performed by: SURGERY

## 2019-09-06 PROCEDURE — 501838 HCHG SUTURE GENERAL: Performed by: SURGERY

## 2019-09-06 PROCEDURE — 160025 RECOVERY II MINUTES (STATS): Performed by: SURGERY

## 2019-09-06 PROCEDURE — 160002 HCHG RECOVERY MINUTES (STAT): Performed by: SURGERY

## 2019-09-06 PROCEDURE — 700101 HCHG RX REV CODE 250: Performed by: ANESTHESIOLOGY

## 2019-09-06 PROCEDURE — A6402 STERILE GAUZE <= 16 SQ IN: HCPCS | Performed by: SURGERY

## 2019-09-06 PROCEDURE — 501582 HCHG TROCAR, THRD BLADED: Performed by: SURGERY

## 2019-09-06 PROCEDURE — 700102 HCHG RX REV CODE 250 W/ 637 OVERRIDE(OP): Performed by: ANESTHESIOLOGY

## 2019-09-06 PROCEDURE — 160028 HCHG SURGERY MINUTES - 1ST 30 MINS LEVEL 3: Performed by: SURGERY

## 2019-09-06 PROCEDURE — 700105 HCHG RX REV CODE 258: Performed by: SURGERY

## 2019-09-06 PROCEDURE — 160039 HCHG SURGERY MINUTES - EA ADDL 1 MIN LEVEL 3: Performed by: SURGERY

## 2019-09-06 PROCEDURE — 700111 HCHG RX REV CODE 636 W/ 250 OVERRIDE (IP)

## 2019-09-06 PROCEDURE — 501584 HCHG TROCAR, THRD CAN&SEAL11X100: Performed by: SURGERY

## 2019-09-06 PROCEDURE — 160048 HCHG OR STATISTICAL LEVEL 1-5: Performed by: SURGERY

## 2019-09-06 RX ORDER — OXYCODONE HCL 5 MG/5 ML
10 SOLUTION, ORAL ORAL
Status: COMPLETED | OUTPATIENT
Start: 2019-09-06 | End: 2019-09-06

## 2019-09-06 RX ORDER — IPRATROPIUM BROMIDE AND ALBUTEROL SULFATE 2.5; .5 MG/3ML; MG/3ML
3 SOLUTION RESPIRATORY (INHALATION)
Status: DISCONTINUED | OUTPATIENT
Start: 2019-09-06 | End: 2019-09-06 | Stop reason: HOSPADM

## 2019-09-06 RX ORDER — HALOPERIDOL 5 MG/ML
INJECTION INTRAMUSCULAR
Status: COMPLETED
Start: 2019-09-06 | End: 2019-09-06

## 2019-09-06 RX ORDER — ROCURONIUM BROMIDE 10 MG/ML
INJECTION, SOLUTION INTRAVENOUS PRN
Status: DISCONTINUED | OUTPATIENT
Start: 2019-09-06 | End: 2019-09-06 | Stop reason: SURG

## 2019-09-06 RX ORDER — LIDOCAINE HYDROCHLORIDE 40 MG/ML
SOLUTION TOPICAL PRN
Status: DISCONTINUED | OUTPATIENT
Start: 2019-09-06 | End: 2019-09-06 | Stop reason: SURG

## 2019-09-06 RX ORDER — BUPIVACAINE HYDROCHLORIDE AND EPINEPHRINE 5; 5 MG/ML; UG/ML
INJECTION, SOLUTION EPIDURAL; INTRACAUDAL; PERINEURAL
Status: DISCONTINUED
Start: 2019-09-06 | End: 2019-09-06 | Stop reason: HOSPADM

## 2019-09-06 RX ORDER — GABAPENTIN 300 MG/1
300 CAPSULE ORAL ONCE
Status: COMPLETED | OUTPATIENT
Start: 2019-09-06 | End: 2019-09-06

## 2019-09-06 RX ORDER — ONDANSETRON 2 MG/ML
INJECTION INTRAMUSCULAR; INTRAVENOUS
Status: COMPLETED
Start: 2019-09-06 | End: 2019-09-06

## 2019-09-06 RX ORDER — SODIUM CHLORIDE, SODIUM LACTATE, POTASSIUM CHLORIDE, CALCIUM CHLORIDE 600; 310; 30; 20 MG/100ML; MG/100ML; MG/100ML; MG/100ML
INJECTION, SOLUTION INTRAVENOUS CONTINUOUS
Status: DISCONTINUED | OUTPATIENT
Start: 2019-09-06 | End: 2019-09-06 | Stop reason: HOSPADM

## 2019-09-06 RX ORDER — ACETAMINOPHEN 500 MG
1000 TABLET ORAL ONCE
Status: COMPLETED | OUTPATIENT
Start: 2019-09-06 | End: 2019-09-06

## 2019-09-06 RX ORDER — HYDROMORPHONE HYDROCHLORIDE 1 MG/ML
0.4 INJECTION, SOLUTION INTRAMUSCULAR; INTRAVENOUS; SUBCUTANEOUS
Status: DISCONTINUED | OUTPATIENT
Start: 2019-09-06 | End: 2019-09-06 | Stop reason: HOSPADM

## 2019-09-06 RX ORDER — DIVALPROEX SODIUM 500 MG/1
500 TABLET, DELAYED RELEASE ORAL 3 TIMES DAILY
COMMUNITY

## 2019-09-06 RX ORDER — LORAZEPAM 2 MG/ML
0.5 INJECTION INTRAMUSCULAR
Status: DISCONTINUED | OUTPATIENT
Start: 2019-09-06 | End: 2019-09-06 | Stop reason: HOSPADM

## 2019-09-06 RX ORDER — GABAPENTIN 300 MG/1
600 CAPSULE ORAL 4 TIMES DAILY
COMMUNITY

## 2019-09-06 RX ORDER — DEXAMETHASONE SODIUM PHOSPHATE 4 MG/ML
INJECTION, SOLUTION INTRA-ARTICULAR; INTRALESIONAL; INTRAMUSCULAR; INTRAVENOUS; SOFT TISSUE PRN
Status: DISCONTINUED | OUTPATIENT
Start: 2019-09-06 | End: 2019-09-06 | Stop reason: SURG

## 2019-09-06 RX ORDER — MEPERIDINE HYDROCHLORIDE 25 MG/ML
6.25 INJECTION INTRAMUSCULAR; INTRAVENOUS; SUBCUTANEOUS
Status: DISCONTINUED | OUTPATIENT
Start: 2019-09-06 | End: 2019-09-06 | Stop reason: HOSPADM

## 2019-09-06 RX ORDER — BUPIVACAINE HYDROCHLORIDE AND EPINEPHRINE 5; 5 MG/ML; UG/ML
INJECTION, SOLUTION EPIDURAL; INTRACAUDAL; PERINEURAL
Status: DISCONTINUED | OUTPATIENT
Start: 2019-09-06 | End: 2019-09-06 | Stop reason: HOSPADM

## 2019-09-06 RX ORDER — MIDAZOLAM HYDROCHLORIDE 1 MG/ML
INJECTION INTRAMUSCULAR; INTRAVENOUS PRN
Status: DISCONTINUED | OUTPATIENT
Start: 2019-09-06 | End: 2019-09-06 | Stop reason: SURG

## 2019-09-06 RX ORDER — DIPHENHYDRAMINE HYDROCHLORIDE 50 MG/ML
12.5 INJECTION INTRAMUSCULAR; INTRAVENOUS
Status: DISCONTINUED | OUTPATIENT
Start: 2019-09-06 | End: 2019-09-06 | Stop reason: HOSPADM

## 2019-09-06 RX ORDER — ONDANSETRON 2 MG/ML
4 INJECTION INTRAMUSCULAR; INTRAVENOUS
Status: DISCONTINUED | OUTPATIENT
Start: 2019-09-06 | End: 2019-09-06 | Stop reason: HOSPADM

## 2019-09-06 RX ORDER — LIDOCAINE HYDROCHLORIDE 20 MG/ML
INJECTION, SOLUTION EPIDURAL; INFILTRATION; INTRACAUDAL; PERINEURAL PRN
Status: DISCONTINUED | OUTPATIENT
Start: 2019-09-06 | End: 2019-09-06 | Stop reason: SURG

## 2019-09-06 RX ORDER — KETOROLAC TROMETHAMINE 30 MG/ML
INJECTION, SOLUTION INTRAMUSCULAR; INTRAVENOUS PRN
Status: DISCONTINUED | OUTPATIENT
Start: 2019-09-06 | End: 2019-09-06 | Stop reason: SURG

## 2019-09-06 RX ORDER — ONDANSETRON 2 MG/ML
INJECTION INTRAMUSCULAR; INTRAVENOUS PRN
Status: DISCONTINUED | OUTPATIENT
Start: 2019-09-06 | End: 2019-09-06 | Stop reason: SURG

## 2019-09-06 RX ORDER — HYDROMORPHONE HYDROCHLORIDE 1 MG/ML
0.2 INJECTION, SOLUTION INTRAMUSCULAR; INTRAVENOUS; SUBCUTANEOUS
Status: DISCONTINUED | OUTPATIENT
Start: 2019-09-06 | End: 2019-09-06 | Stop reason: HOSPADM

## 2019-09-06 RX ORDER — CEFAZOLIN SODIUM 1 G/3ML
INJECTION, POWDER, FOR SOLUTION INTRAMUSCULAR; INTRAVENOUS PRN
Status: DISCONTINUED | OUTPATIENT
Start: 2019-09-06 | End: 2019-09-06 | Stop reason: SURG

## 2019-09-06 RX ORDER — HALOPERIDOL 5 MG/ML
1 INJECTION INTRAMUSCULAR
Status: DISCONTINUED | OUTPATIENT
Start: 2019-09-06 | End: 2019-09-06 | Stop reason: HOSPADM

## 2019-09-06 RX ORDER — OXYCODONE HYDROCHLORIDE AND ACETAMINOPHEN 5; 325 MG/1; MG/1
1-2 TABLET ORAL EVERY 4 HOURS PRN
Qty: 35 TAB | Refills: 0 | Status: SHIPPED | OUTPATIENT
Start: 2019-09-06 | End: 2019-09-12

## 2019-09-06 RX ORDER — OXYCODONE HCL 5 MG/5 ML
5 SOLUTION, ORAL ORAL
Status: COMPLETED | OUTPATIENT
Start: 2019-09-06 | End: 2019-09-06

## 2019-09-06 RX ORDER — HYDROMORPHONE HYDROCHLORIDE 1 MG/ML
0.1 INJECTION, SOLUTION INTRAMUSCULAR; INTRAVENOUS; SUBCUTANEOUS
Status: DISCONTINUED | OUTPATIENT
Start: 2019-09-06 | End: 2019-09-06 | Stop reason: HOSPADM

## 2019-09-06 RX ORDER — TOPIRAMATE 100 MG/1
100 TABLET, FILM COATED ORAL 2 TIMES DAILY
COMMUNITY

## 2019-09-06 RX ADMIN — FENTANYL CITRATE 50 MCG: 50 INJECTION, SOLUTION INTRAMUSCULAR; INTRAVENOUS at 15:25

## 2019-09-06 RX ADMIN — ROCURONIUM BROMIDE 40 MG: 10 INJECTION, SOLUTION INTRAVENOUS at 15:29

## 2019-09-06 RX ADMIN — SUGAMMADEX 200 MG: 100 INJECTION, SOLUTION INTRAVENOUS at 16:12

## 2019-09-06 RX ADMIN — LORAZEPAM 0.5 MG: 2 INJECTION INTRAMUSCULAR; INTRAVENOUS at 17:00

## 2019-09-06 RX ADMIN — LIDOCAINE HYDROCHLORIDE 3 ML: 40 SOLUTION TOPICAL at 15:33

## 2019-09-06 RX ADMIN — KETOROLAC TROMETHAMINE 30 MG: 30 INJECTION, SOLUTION INTRAMUSCULAR at 16:16

## 2019-09-06 RX ADMIN — FENTANYL CITRATE 50 MCG: 50 INJECTION, SOLUTION INTRAMUSCULAR; INTRAVENOUS at 16:32

## 2019-09-06 RX ADMIN — DEXAMETHASONE SODIUM PHOSPHATE 8 MG: 4 INJECTION, SOLUTION INTRA-ARTICULAR; INTRALESIONAL; INTRAMUSCULAR; INTRAVENOUS; SOFT TISSUE at 15:37

## 2019-09-06 RX ADMIN — ACETAMINOPHEN 1000 MG: 500 TABLET ORAL at 14:28

## 2019-09-06 RX ADMIN — FENTANYL CITRATE 50 MCG: 50 INJECTION, SOLUTION INTRAMUSCULAR; INTRAVENOUS at 16:04

## 2019-09-06 RX ADMIN — FENTANYL CITRATE 50 MCG: 50 INJECTION, SOLUTION INTRAMUSCULAR; INTRAVENOUS at 16:29

## 2019-09-06 RX ADMIN — SODIUM CHLORIDE, POTASSIUM CHLORIDE, SODIUM LACTATE AND CALCIUM CHLORIDE: 600; 310; 30; 20 INJECTION, SOLUTION INTRAVENOUS at 14:27

## 2019-09-06 RX ADMIN — MIDAZOLAM 2 MG: 1 INJECTION INTRAMUSCULAR; INTRAVENOUS at 15:24

## 2019-09-06 RX ADMIN — HALOPERIDOL 1 MG: 5 INJECTION INTRAMUSCULAR at 16:49

## 2019-09-06 RX ADMIN — GABAPENTIN 300 MG: 300 CAPSULE ORAL at 14:27

## 2019-09-06 RX ADMIN — ONDANSETRON 4 MG: 2 INJECTION INTRAMUSCULAR; INTRAVENOUS at 16:11

## 2019-09-06 RX ADMIN — PROPOFOL 150 MG: 10 INJECTION, EMULSION INTRAVENOUS at 15:28

## 2019-09-06 RX ADMIN — LIDOCAINE HYDROCHLORIDE 60 MG: 20 INJECTION, SOLUTION EPIDURAL; INFILTRATION; INTRACAUDAL at 15:28

## 2019-09-06 RX ADMIN — FENTANYL CITRATE 50 MCG: 50 INJECTION, SOLUTION INTRAMUSCULAR; INTRAVENOUS at 15:51

## 2019-09-06 RX ADMIN — CEFAZOLIN 2 G: 330 INJECTION, POWDER, FOR SOLUTION INTRAMUSCULAR; INTRAVENOUS at 15:36

## 2019-09-06 RX ADMIN — HALOPERIDOL LACTATE 1 MG: 5 INJECTION, SOLUTION INTRAMUSCULAR at 16:49

## 2019-09-06 RX ADMIN — PROPOFOL 50 MG: 10 INJECTION, EMULSION INTRAVENOUS at 15:31

## 2019-09-06 RX ADMIN — OXYCODONE HYDROCHLORIDE 10 MG: 5 SOLUTION ORAL at 17:23

## 2019-09-06 NOTE — ANESTHESIA PROCEDURE NOTES
Airway  Date/Time: 9/6/2019 3:33 PM  Performed by: Jessica Rodriges M.D.  Authorized by: Jessica Rodriges M.D.     Location:  OR  Urgency:  Elective  Difficult Airway: No    Indications for Airway Management:  Anesthesia  Spontaneous Ventilation: absent    Sedation Level:  Deep  Preoxygenated: Yes    Patient Position:  Sniffing  Mask Difficulty Assessment:  1 - vent by mask  Final Airway Type:  Endotracheal airway  Final Endotracheal Airway:  ETT  Cuffed: Yes    Technique Used for Successful ETT Placement:  Direct laryngoscopy  Insertion Site:  Oral  Blade Type:  Dm  Laryngoscope Blade/Videolaryngoscope Blade Size:  3  ETT Size (mm):  7.0  Measured from:  Gums  ETT to Gums (cm):  20  Placement Verified by: auscultation and capnometry    Cormack-Lehane Classification:  Grade I - full view of glottis  Number of Attempts at Approach:  2   1st attempt by MS3 - unmable to obtain view. Small cut to lower lip.  Airway taken over with grade I view with MAC 3. Lube applied to lip

## 2019-09-06 NOTE — OP REPORT
DATE OF SERVICE:  09/06/2019    PREOPERATIVE DIAGNOSIS:  Symptomatic biliary dyskinesia.    POSTOPERATIVE DIAGNOSES:  1.  Symptomatic biliary dyskinesia.  2.  Chronic cholecystitis.    INDICATION FOR SURGERY:  This is a 54-year-old female with epigastric pain and   nausea for approximately 3 months.  This is worse after fatty foods.  She   underwent 2 abdominal ultrasounds, both of which were without notable   abnormality.  She did undergo a HIDA scan, which showed an ejection fraction   of 10%.  She was also noted to have similar nausea and epigastric pain with   administration of CCK.  She was referred to my office and scheduled for a   laparoscopic cholecystectomy for her symptomatic biliary dyskinesia.    PROCEDURE:  Laparoscopic cholecystectomy.    SURGEON:  Kameron Fragoso MD    ASSISTANT:  Campos Garrido MD    ANESTHESIOLOGIST:  Jessica Rodriges MD    ANESTHESIA:  General endotracheal anesthesia.    FINDINGS:  Scar tissue and inflammation in the triangle of Calot consistent   with chronic cholecystitis.    PROCEDURE NARRATIVE:  Signed informed consent was on the chart at the time of   the procedure.  The patient was brought to the operating room and placed in   the supine position.  General endotracheal anesthesia was induced and the skin   over the abdomen was prepped and draped in a sterile fashion.  The patient   was administered 2 grams of Ancef IV preoperatively.  A time-out was performed   after first infusing the skin and soft tissue with local anesthetic, which in   this case was 0.5% Marcaine with epinephrine.  A 2 cm periumbilical incision   was made superior to the umbilicus and underlying soft tissue was dissected   through bluntly to the level of the fascia.  The base stalk of the umbilicus   was grasped and lifted and a Veress needle was introduced into the peritoneal   space on the first attempt.  Pneumoperitoneum was achieved without difficulty   and the Veress needle was replaced  with an 11 mm trocar, through which a   laparoscopic camera was placed and underlying viscus was inspected, no   evidence of trauma was appreciated.  After first infusing the skin and soft   tissue with local anesthetic, a 2 cm horizontal incision was made just to the   patient's right of midline inferior to the costal margin, through which an 11   mm port was placed under direct visualization via the camera.  Using similar   technique, two 5 mm ports were placed in the right upper quadrant.    Gallbladder was grasped at the fundus and the adhesions to the gallbladder   were taken down using a combination of Bovie cautery and blunt dissection.    Eventually, I was able to visualize the infundibulum.  This was then grasped   and put under gentle tension to the patient's right.  The cystic duct and   cystic artery were dissected away from the surrounding soft tissue.  There was   a scar tissue in the triangle of Calot, which made this dissection somewhat   difficult, but ultimately, I was able to delineate the anatomy of the cystic   artery first and then the cystic duct.  Once I had a critical view, I then   clipped the cystic duct 3 times distally and once proximally, being careful   not to impinge on the common bile duct.  Cystic duct was then divided between   those 2 groups of clips.  Cystic artery, which had previously been dissected   free of the surrounding soft tissue, was clipped twice proximally, once   distally, and divided between the 2 groups of clips.  The peritoneal   attachments on either side of the gallbladder as well as the soft tissue   attachments between the gallbladder and the gallbladder fossa were taken down   using careful Bovie cautery.  At no time was the gallbladder entered during   this portion of the procedure.  The gallbladder was placed into an EndoCatch   bag and removed using the periumbilical port site as access.  The port at that   site was replaced.  The gallbladder fossa was  inspected.  Hemostasis was   noted to be excellent.  The right upper quadrant was irrigated with 500 mL of   warm normal saline, the irrigant returned clear.  The superior midline port   was removed and using an EndoClose device under direct visualization via the   camera, a 0 Vicryl suture was placed across the fascial defect.  This was then   tagged and the port was replaced at that site and used as access for the   camera.  The periumbilical port was removed and using an EndoClose device   under direct visualization via the camera, 2 separate 0 Vicryl sutures were   placed across the fascial defect.  These were tightened and tied being careful   not to include any underlying structures.  The two separate 5 mm ports were   then removed and no evidence of bleeding was noted on laparoscopic inspection.    The superior midline port was opened and removed as pneumoperitoneum was   allowed to .  The previously placed 0 Vicryl suture was then tightened   and tied at that site.  Each of the 4 incisions were irrigated and dried and   each was closed at the level of the skin using a running 4-0 Vicryl   subcuticular stitch.  The periumbilical incision was dressed with a 2x2 gauze   pad followed by clear Tegaderm and the other 3 incisions were dressed with   clear Tegaderms.    FLUIDS:  600 mL of crystalloid.    ESTIMATED BLOOD LOSS:  2 mL    DRAINS:  None.    SPECIMENS:  Gallbladder to pathology.    COMPLICATIONS:  None.    DISPOSITION:  The patient was in stable condition to postanesthesia care unit.       ____________________________________     MD LIS Encinas / EMIR    DD:  2019 16:35:27  DT:  2019 16:57:11    D#:  [id number]  Job#:  [id number]

## 2019-09-06 NOTE — ANESTHESIA TIME REPORT
Anesthesia Start and Stop Event Times     Date Time Event    9/6/2019 1518 Ready for Procedure     1524 Anesthesia Start     1636 Anesthesia Stop        Responsible Staff  09/06/19    Name Role Begin End    Jessica Rodriges M.D. Anesth 1524 1636        Preop Diagnosis (Free Text):  Pre-op Diagnosis     BILIARY DYSKINESIA        Preop Diagnosis (Codes):    Post op Diagnosis  Biliary dyskinesia      Premium Reason  A. 3PM - 7AM    Comments:

## 2019-09-06 NOTE — OR SURGEON
Immediate Post OP Note    PreOp Diagnosis: Symptomatic biliary dyskinesia    PostOp Diagnosis: Symptomatic biliary dyskinesia    Procedure(s):  CHOLECYSTECTOMY, LAPAROSCOPIC - Wound Class: Clean Contaminated    Surgeon(s):  AMADOR Moreau M.D.    Anesthesiologist/Type of Anesthesia:  Anesthesiologist: Jessica Rodriges M.D./General    Surgical Staff:  Circulator: Antonia Condon R.N.  Scrub Person: Jeff Sun    Specimens removed if any:  ID Type Source Tests Collected by Time Destination   A : gallbladder Tissue Gallbladder PATHOLOGY SPECIMEN Kameron Fragoso M.D. 9/6/2019  3:53 PM        Estimated Blood Loss: 2 ml    Findings: Adhesions and scar tissue in Tobias of Calot consistent with chronic cholecystitis    Complications: None        9/6/2019 4:24 PM Kameron Fragoso M.D.

## 2019-09-06 NOTE — OR NURSING
1634 Pt arrived from OR post CHOLECYSTECTOMY, LAPAROSCOPIC, report received. Pt breathing unlabored with clear lung sounds bilat.    1645 Pt shivering, provided blankets and heat from [employer]. Pt medicated for nausea. Pt updated on POC and she is calming and relaxing.     1730 Pt reports nausea has resolved and pain is getting better. Pt to Phase II    1820 Pt ride @ BS.     1845 Discharge instructions and medications gone over with Pt and ride. All questions answered. Pt meets DC criteria. PIV DC'd. RX for Percocet provided. Pt transported to [facility] via [facility] in stable condition.

## 2019-09-06 NOTE — ANESTHESIA PREPROCEDURE EVALUATION
Relevant Problems   CARDIAC   (+) Migraine without aura, intractable      Other   (+) Conversion disorder with seizures or convulsions   (+) Fibromyalgia       Physical Exam    Airway   Mallampati: I  TM distance: >3 FB  Neck ROM: full       Cardiovascular - normal exam  Rhythm: regular  Rate: normal  (-) murmur     Dental - normal exam  (+) upper dentures         Pulmonary - normal exam  Breath sounds clear to auscultation     Abdominal    Neurological - normal exam               Anesthesia Plan    ASA 2       Plan - general       Airway plan will be ETT        Induction: intravenous    Postoperative Plan: Postoperative administration of opioids is intended.    Pertinent diagnostic labs and testing reviewed    Informed Consent:    Anesthetic plan and risks discussed with patient.    Use of blood products discussed with: patient whom consented to blood products.

## 2019-09-07 LAB — PATHOLOGY CONSULT NOTE: NORMAL

## 2019-09-07 NOTE — DISCHARGE INSTRUCTIONS
ACTIVITY: Rest and take it easy for the first 24 hours.  A responsible adult is recommended to remain with you during that time.  It is normal to feel sleepy.  We encourage you to not do anything that requires balance, judgment or coordination.    MILD FLU-LIKE SYMPTOMS ARE NORMAL. YOU MAY EXPERIENCE GENERALIZED MUSCLE ACHES, THROAT IRRITATION, HEADACHE AND/OR SOME NAUSEA.    FOR 24 HOURS DO NOT:  Drive, operate machinery or run household appliances.  Drink beer or alcoholic beverages.   Make important decisions or sign legal documents.    SPECIAL INSTRUCTIONS: No lifting greater than 20 pounds for 4 weeks.  Use over the counter laxative of choice if constipated.    Follow up with Dr. Fragoso ([facility], [phone]) as previously appointed, or in 10-14 days.    DIET: To avoid nausea, slowly advance diet as tolerated, avoiding spicy or greasy foods for the first day.  Add more substantial food to your diet according to your physician's instructions.  Babies can be fed formula or breast milk as soon as they are hungry.  INCREASE FLUIDS AND FIBER TO AVOID CONSTIPATION.    SURGICAL DRESSING/BATHING: Remove dressing(s) and begin showering on 9/9/19, but no baths, hot tubs, or swimming until follow up appointment.    FOLLOW-UP APPOINTMENT:  A follow-up appointment should be arranged with your doctor Follow up with Dr. Fragoso ([facility], [phone]) as previously appointed, or in 10-14 days.; call to schedule.    You should CALL YOUR PHYSICIAN if you develop:  Fever greater than 101 degrees F.  Pain not relieved by medication, or persistent nausea or vomiting.  Excessive bleeding (blood soaking through dressing) or unexpected drainage from the wound.  Extreme redness or swelling around the incision site, drainage of pus or foul smelling drainage.  Inability to urinate or empty your bladder within 8 hours.  Problems with breathing or chest pain.    You should call 911 if you develop  problems with breathing or chest pain.  If you are unable to contact your doctor or surgical center, you should go to the nearest emergency room or urgent care center.  Physician's telephone #: [phone]    If any questions arise, call your doctor.  If your doctor is not available, please feel free to call the Surgical Center at [phone].  The Center is open Monday through Friday from 7AM to 7PM.  You can also call the HEALTH HOTLINE open 24 hours/day, 7 days/week and speak to a nurse at [phone], or toll free at [phone].    A registered nurse may call you a few days after your surgery to see how you are doing after your procedure.    MEDICATIONS: Resume taking daily medication.  Take prescribed pain medication with food.  If no medication is prescribed, you may take non-aspirin pain medication if needed.  PAIN MEDICATION CAN BE VERY CONSTIPATING.  Take a stool softener or laxative such as senokot, pericolace, or milk of magnesia if needed.    Prescription given for Percocet.  Last pain medication given at 5:23pm.    If your physician has prescribed pain medication that includes Acetaminophen (Tylenol), do not take additional Acetaminophen (Tylenol) while taking the prescribed medication.    Depression / Suicide Risk    As you are discharged from this [facility] facility, it is important to learn how to keep safe from harming yourself.    Recognize the warning signs:  · Abrupt changes in personality, positive or negative- including increase in energy   · Giving away possessions  · Change in eating patterns- significant weight changes-  positive or negative  · Change in sleeping patterns- unable to sleep or sleeping all the time   · Unwillingness or inability to communicate  · Depression  · Unusual sadness, discouragement and loneliness  · Talk of wanting to die  · Neglect of personal appearance   · Rebelliousness- reckless behavior  · Withdrawal from people/activities they love  · Confusion-  inability to concentrate     If you or a loved one observes any of these behaviors or has concerns about self-harm, here's what you can do:  · Talk about it- your feelings and reasons for harming yourself  · Remove any means that you might use to hurt yourself (examples: pills, rope, extension cords, firearm)  · Get professional help from the community (Mental Health, Substance Abuse, psychological counseling)  · Do not be alone:Call your Safe Contact- someone whom you trust who will be there for you.  · Call your local CRISIS HOTLINE 859-2939 or 291-226-9759  · Call your local Children's Mobile Crisis Response Team [place] (132) 528-5806 or www.Infakt.pl  · Call the toll free National Suicide Prevention Hotlines   · National Suicide Prevention Lifeline 827-694-QLLV (6525)  · National Hope Line Network 800-SUICIDE (081-0404)

## 2019-10-04 ENCOUNTER — NON-PROVIDER VISIT (OUTPATIENT)
Dept: OCCUPATIONAL MEDICINE | Facility: CLINIC | Age: 54
End: 2019-10-04

## 2019-10-04 DIAGNOSIS — Z11.1 PPD SCREENING TEST: ICD-10-CM

## 2019-10-04 PROCEDURE — 86580 TB INTRADERMAL TEST: CPT | Performed by: NURSE PRACTITIONER

## 2019-10-07 ENCOUNTER — NON-PROVIDER VISIT (OUTPATIENT)
Dept: OCCUPATIONAL MEDICINE | Facility: CLINIC | Age: 54
End: 2019-10-07

## 2019-10-07 DIAGNOSIS — Z11.1 ENCOUNTER FOR PPD SKIN TEST READING: ICD-10-CM

## 2019-10-07 LAB — TB WHEAL 3D P 5 TU DIAM: NORMAL MM

## 2019-10-07 NOTE — ANESTHESIA POSTPROCEDURE EVALUATION
Patient: Enrique Briones    Procedure Summary     Date:  09/06/19 Room / Location:  [facility] ROOM 25 / SURGERY SAME DAY [facility] ORS    Anesthesia Start:  1524 Anesthesia Stop:  1636    Procedure:  CHOLECYSTECTOMY, LAPAROSCOPIC (N/A Abdomen) Diagnosis:  (BILIARY DYSKINESIA)    Surgeon:  Kameron Fragoso M.D. Responsible Provider:  Jessica Rodriges M.D.    Anesthesia Type:  general ASA Status:  2          Final Anesthesia Type: general  Last vitals  BP   Blood Pressure: 102/72, NIBP: 130/71    Temp   36.7 °C (98 °F)    Pulse   Pulse: 83   Resp   16    SpO2   100 %      Anesthesia Post Evaluation    Patient location during evaluation: PACU  Patient participation: complete - patient participated  Level of consciousness: awake and alert    Airway patency: patent  Anesthetic complications: no  Cardiovascular status: hemodynamically stable  Respiratory status: acceptable  Hydration status: euvolemic    PONV: none           Nurse Pain Score: 4 (NPRS)        
How Severe Is Your Rash?: mild
Is This A New Presentation, Or A Follow-Up?: Rash

## 2020-08-18 ENCOUNTER — NON-PROVIDER VISIT (OUTPATIENT)
Dept: OCCUPATIONAL MEDICINE | Facility: CLINIC | Age: 55
End: 2020-08-18

## 2020-08-18 DIAGNOSIS — Z11.1 ENCOUNTER FOR PPD TEST: ICD-10-CM

## 2020-08-18 PROCEDURE — 86580 TB INTRADERMAL TEST: CPT | Performed by: PREVENTIVE MEDICINE

## 2020-08-21 ENCOUNTER — NON-PROVIDER VISIT (OUTPATIENT)
Dept: OCCUPATIONAL MEDICINE | Facility: CLINIC | Age: 55
End: 2020-08-21

## 2020-08-21 DIAGNOSIS — Z02.89 ENCOUNTER FOR OCCUPATIONAL HEALTH EXAMINATION: Primary | ICD-10-CM

## 2020-08-21 LAB — TB WHEAL 3D P 5 TU DIAM: NORMAL MM

## 2021-08-19 ENCOUNTER — NON-PROVIDER VISIT (OUTPATIENT)
Dept: OCCUPATIONAL MEDICINE | Facility: CLINIC | Age: 56
End: 2021-08-19

## 2021-08-19 DIAGNOSIS — Z11.1 ENCOUNTER FOR PPD TEST: Primary | ICD-10-CM

## 2021-08-19 PROCEDURE — 86580 TB INTRADERMAL TEST: CPT | Performed by: NURSE PRACTITIONER

## 2021-08-24 ENCOUNTER — HOSPITAL ENCOUNTER (OUTPATIENT)
Facility: MEDICAL CENTER | Age: 56
End: 2021-08-24
Attending: PREVENTIVE MEDICINE
Payer: COMMERCIAL

## 2021-08-24 ENCOUNTER — NON-PROVIDER VISIT (OUTPATIENT)
Dept: OCCUPATIONAL MEDICINE | Facility: CLINIC | Age: 56
End: 2021-08-24

## 2021-08-24 DIAGNOSIS — Z02.89 ENCOUNTER FOR OCCUPATIONAL HEALTH EXAMINATION: ICD-10-CM

## 2021-08-24 DIAGNOSIS — Z02.89 ENCOUNTER FOR OCCUPATIONAL HEALTH EXAMINATION: Primary | ICD-10-CM

## 2021-08-24 PROCEDURE — 86480 TB TEST CELL IMMUN MEASURE: CPT | Performed by: PREVENTIVE MEDICINE

## 2021-08-26 LAB
GAMMA INTERFERON BACKGROUND BLD IA-ACNC: 0.03 IU/ML
M TB IFN-G BLD-IMP: NEGATIVE
M TB IFN-G CD4+ BCKGRND COR BLD-ACNC: 0 IU/ML
MITOGEN IGNF BCKGRD COR BLD-ACNC: >10 IU/ML
QFT TB2 - NIL TBQ2: 0 IU/ML

## 2023-05-24 ENCOUNTER — APPOINTMENT (RX ONLY)
Dept: URBAN - METROPOLITAN AREA CLINIC 4 | Facility: CLINIC | Age: 58
Setting detail: DERMATOLOGY
End: 2023-05-24

## 2023-05-24 DIAGNOSIS — L81.4 OTHER MELANIN HYPERPIGMENTATION: ICD-10-CM

## 2023-05-24 DIAGNOSIS — D18.0 HEMANGIOMA: ICD-10-CM

## 2023-05-24 DIAGNOSIS — L85.3 XEROSIS CUTIS: ICD-10-CM

## 2023-05-24 DIAGNOSIS — D36.1 BENIGN NEOPLASM OF PERIPHERAL NERVES AND AUTONOMIC NERVOUS SYSTEM: ICD-10-CM

## 2023-05-24 DIAGNOSIS — D22 MELANOCYTIC NEVI: ICD-10-CM

## 2023-05-24 DIAGNOSIS — L72.11 PILAR CYST: ICD-10-CM

## 2023-05-24 DIAGNOSIS — L82.1 OTHER SEBORRHEIC KERATOSIS: ICD-10-CM

## 2023-05-24 DIAGNOSIS — Z71.89 OTHER SPECIFIED COUNSELING: ICD-10-CM

## 2023-05-24 PROBLEM — D36.15 BENIGN NEOPLASM OF PERIPHERAL NERVES AND AUTONOMIC NERVOUS SYSTEM OF ABDOMEN: Status: ACTIVE | Noted: 2023-05-24

## 2023-05-24 PROBLEM — D18.01 HEMANGIOMA OF SKIN AND SUBCUTANEOUS TISSUE: Status: ACTIVE | Noted: 2023-05-24

## 2023-05-24 PROBLEM — D48.5 NEOPLASM OF UNCERTAIN BEHAVIOR OF SKIN: Status: ACTIVE | Noted: 2023-05-24

## 2023-05-24 PROBLEM — D22.5 MELANOCYTIC NEVI OF TRUNK: Status: ACTIVE | Noted: 2023-05-24

## 2023-05-24 PROCEDURE — 99203 OFFICE O/P NEW LOW 30 MIN: CPT

## 2023-05-24 PROCEDURE — ? DEFER

## 2023-05-24 PROCEDURE — ? COUNSELING

## 2023-05-24 ASSESSMENT — LOCATION SIMPLE DESCRIPTION DERM
LOCATION SIMPLE: ANTERIOR SCALP
LOCATION SIMPLE: LEFT UPPER BACK
LOCATION SIMPLE: RIGHT UPPER BACK
LOCATION SIMPLE: ABDOMEN

## 2023-05-24 ASSESSMENT — LOCATION DETAILED DESCRIPTION DERM
LOCATION DETAILED: LEFT SUPERIOR MEDIAL UPPER BACK
LOCATION DETAILED: RIGHT MEDIAL UPPER BACK
LOCATION DETAILED: RIGHT RIB CAGE
LOCATION DETAILED: MID-FRONTAL SCALP
LOCATION DETAILED: LEFT MEDIAL UPPER BACK

## 2023-05-24 ASSESSMENT — LOCATION ZONE DERM
LOCATION ZONE: TRUNK
LOCATION ZONE: SCALP

## 2023-05-24 NOTE — PROCEDURE: COUNSELING
Detail Level: Zone
Detail Level: Simple
Detail Level: Generalized
Moisturizer Recommendations: Cerave Cream
Detail Level: Detailed

## 2024-05-09 ENCOUNTER — HOSPITAL ENCOUNTER (EMERGENCY)
Facility: MEDICAL CENTER | Age: 59
End: 2024-05-09
Attending: EMERGENCY MEDICINE
Payer: MEDICARE

## 2024-05-09 ENCOUNTER — APPOINTMENT (OUTPATIENT)
Dept: RADIOLOGY | Facility: MEDICAL CENTER | Age: 59
End: 2024-05-09
Attending: EMERGENCY MEDICINE
Payer: MEDICARE

## 2024-05-09 VITALS
HEART RATE: 73 BPM | WEIGHT: 150 LBS | TEMPERATURE: 97.1 F | HEIGHT: 65 IN | SYSTOLIC BLOOD PRESSURE: 160 MMHG | OXYGEN SATURATION: 98 % | DIASTOLIC BLOOD PRESSURE: 68 MMHG | BODY MASS INDEX: 24.99 KG/M2 | RESPIRATION RATE: 16 BRPM

## 2024-05-09 DIAGNOSIS — R51.9 ACUTE NONINTRACTABLE HEADACHE, UNSPECIFIED HEADACHE TYPE: ICD-10-CM

## 2024-05-09 DIAGNOSIS — F41.8 SITUATIONAL ANXIETY: ICD-10-CM

## 2024-05-09 LAB
ALBUMIN SERPL BCP-MCNC: 4.8 G/DL (ref 3.2–4.9)
ALBUMIN/GLOB SERPL: 1.6 G/DL
ALP SERPL-CCNC: 102 U/L (ref 30–99)
ALT SERPL-CCNC: 60 U/L (ref 2–50)
ANION GAP SERPL CALC-SCNC: 15 MMOL/L (ref 7–16)
AST SERPL-CCNC: 49 U/L (ref 12–45)
BASOPHILS # BLD AUTO: 0.9 % (ref 0–1.8)
BASOPHILS # BLD: 0.06 K/UL (ref 0–0.12)
BILIRUB SERPL-MCNC: 0.6 MG/DL (ref 0.1–1.5)
BUN SERPL-MCNC: 16 MG/DL (ref 8–22)
CALCIUM ALBUM COR SERPL-MCNC: 9 MG/DL (ref 8.5–10.5)
CALCIUM SERPL-MCNC: 9.6 MG/DL (ref 8.5–10.5)
CHLORIDE SERPL-SCNC: 111 MMOL/L (ref 96–112)
CO2 SERPL-SCNC: 17 MMOL/L (ref 20–33)
CREAT SERPL-MCNC: 0.62 MG/DL (ref 0.5–1.4)
EKG IMPRESSION: NORMAL
EOSINOPHIL # BLD AUTO: 0.03 K/UL (ref 0–0.51)
EOSINOPHIL NFR BLD: 0.5 % (ref 0–6.9)
ERYTHROCYTE [DISTWIDTH] IN BLOOD BY AUTOMATED COUNT: 42.5 FL (ref 35.9–50)
GFR SERPLBLD CREATININE-BSD FMLA CKD-EPI: 101 ML/MIN/1.73 M 2
GLOBULIN SER CALC-MCNC: 3 G/DL (ref 1.9–3.5)
GLUCOSE SERPL-MCNC: 91 MG/DL (ref 65–99)
HCT VFR BLD AUTO: 44.1 % (ref 37–47)
HGB BLD-MCNC: 15.6 G/DL (ref 12–16)
IMM GRANULOCYTES # BLD AUTO: 0.01 K/UL (ref 0–0.11)
IMM GRANULOCYTES NFR BLD AUTO: 0.2 % (ref 0–0.9)
LYMPHOCYTES # BLD AUTO: 1.12 K/UL (ref 1–4.8)
LYMPHOCYTES NFR BLD: 17.5 % (ref 22–41)
MCH RBC QN AUTO: 33.1 PG (ref 27–33)
MCHC RBC AUTO-ENTMCNC: 35.4 G/DL (ref 32.2–35.5)
MCV RBC AUTO: 93.4 FL (ref 81.4–97.8)
MONOCYTES # BLD AUTO: 0.63 K/UL (ref 0–0.85)
MONOCYTES NFR BLD AUTO: 9.8 % (ref 0–13.4)
NEUTROPHILS # BLD AUTO: 4.55 K/UL (ref 1.82–7.42)
NEUTROPHILS NFR BLD: 71.1 % (ref 44–72)
NRBC # BLD AUTO: 0 K/UL
NRBC BLD-RTO: 0 /100 WBC (ref 0–0.2)
PLATELET # BLD AUTO: 297 K/UL (ref 164–446)
PMV BLD AUTO: 10 FL (ref 9–12.9)
POTASSIUM SERPL-SCNC: 3.5 MMOL/L (ref 3.6–5.5)
PROT SERPL-MCNC: 7.8 G/DL (ref 6–8.2)
RBC # BLD AUTO: 4.72 M/UL (ref 4.2–5.4)
SODIUM SERPL-SCNC: 143 MMOL/L (ref 135–145)
TROPONIN T SERPL-MCNC: <6 NG/L (ref 6–19)
VALPROATE SERPL-MCNC: <2.8 UG/ML (ref 50–100)
WBC # BLD AUTO: 6.4 K/UL (ref 4.8–10.8)

## 2024-05-09 RX ORDER — ACETAMINOPHEN 500 MG
1000 TABLET ORAL ONCE
Status: COMPLETED | OUTPATIENT
Start: 2024-05-09 | End: 2024-05-09

## 2024-05-09 RX ORDER — ALPRAZOLAM 0.25 MG/1
0.5 TABLET ORAL ONCE
Status: DISCONTINUED | OUTPATIENT
Start: 2024-05-09 | End: 2024-05-09

## 2024-05-09 RX ORDER — SODIUM CHLORIDE 9 MG/ML
1000 INJECTION, SOLUTION INTRAVENOUS ONCE
Status: COMPLETED | OUTPATIENT
Start: 2024-05-09 | End: 2024-05-09

## 2024-05-09 RX ORDER — ALPRAZOLAM 0.25 MG/1
0.5 TABLET ORAL ONCE
Status: COMPLETED | OUTPATIENT
Start: 2024-05-09 | End: 2024-05-09

## 2024-05-09 RX ADMIN — ACETAMINOPHEN 1000 MG: 500 TABLET, FILM COATED ORAL at 13:30

## 2024-05-09 RX ADMIN — ALPRAZOLAM 0.5 MG: 0.25 TABLET ORAL at 13:30

## 2024-05-09 RX ADMIN — SODIUM CHLORIDE 1000 ML: 9 INJECTION, SOLUTION INTRAVENOUS at 13:39

## 2024-05-09 NOTE — DISCHARGE INSTRUCTIONS
You were seen in the Emergency Department for anxiety, headache, insomnia.    Labs, CT scan were completed without significant acute abnormalities.    Please use 1,000mg of tylenol or 600mg of ibuprofen every 6 hours as needed for pain.    Please follow up with your primary care physician and Dr. Cummings regarding ongoing care.    Return to the Emergency Department with severe headaches, new neurologic changes, chest pain, or other concerns.

## 2024-05-09 NOTE — ED TRIAGE NOTES
Chief Complaint   Patient presents with    Anxiety     BIB EMS from home. Pt reports increasing anxiety and headaches over the last 3 days.      Aox4, GCS 15  .     Pt appears anxious, tearful during triage. Pt reports that she has a prescription for Xanax and has not been able to locate her prescription. Last dose was taken 3 days ago. Per EMS, pt reported that she was diagnosed with subdural hematoma on Monday by her neurologist.     Hx: anxiety, FM    Pt given 2.5mg versed IM by EMS pta.

## 2024-05-09 NOTE — ED NOTES
Rounded on pt. Pt resting comfortably in bed. Bed locked and in lowest position. Visitors at bedside. Updated on POC, awaiting re-eval. No further needs at this time.

## 2024-05-09 NOTE — ED NOTES
Pt returned from CT. Pt attached to vitals monitor, visitors at bedside. Bed locked and in lowest position, no further needs at this time.

## 2024-05-09 NOTE — ED PROVIDER NOTES
"ED Provider Note    CHIEF COMPLAINT  Chief Complaint   Patient presents with    Anxiety     BIB EMS from home. Pt reports increasing anxiety and headaches over the last 3 days.      EXTERNAL RECORDS REVIEWED  Review of records shows the patient was last seen at [facility] in 2021, no other recent visits. PDMP was reviewed showing she was getting a regular Xanax prescription until 3/2024; she last received a small Ativan prescription on 4/17/2024.    HPI/ROS  LIMITATION TO HISTORY   Select: : None  OUTSIDE HISTORIAN(S):  None    Enrique Briones is a 58 y.o. female who presents to the Emergency Department via EMS with increasing anxiety onset three days ago. The patient states she has been \"feeling upset\" and hitting herself in the head due to anxiety and constant pain from Fibromyalgia; she notes she stopped going to therapy due to insurance issues. Denies suicidal ideation. Patient endorses additional difficulty sleeping, reduced appetite, and worsening sharp headaches. She reports she is seen regularly by Dr. Cummings (Neurosurgeries) for MRIs to monitor a fluid-filled cyst in her prain; her last MRI was one week ago and revealed a possible subdural hematoma?. Denies chest pain, difficulty breathing, or recent falls. She adds she was instructed to obtain a CT scan but has not been scheduled yet. Patient has a history of migraines and seizures. She takes Depakote, Topamax, Xarelto, and Meloxicam daily. Denies taking blood thinning medications.    PAST MEDICAL HISTORY  Past Medical History:   Diagnosis Date    Arthritis     Bowel habit changes     constipation    Dental disorder     uppers    Fibromyalgia     Fibromyalgia     Headache(784.0)     Heart burn     History of anemia     Migraine     Psychiatric problem     depression, anxiety    Renal disorder     right \"floating kidney\"    Seizure (HCC) 2017    was on meds; not currently        SURGICAL HISTORY  Past Surgical History:   Procedure " Laterality Date    AMA BY LAPAROSCOPY N/A 9/6/2019    Procedure: CHOLECYSTECTOMY, LAPAROSCOPIC;  Surgeon: Kameron Fragoso M.D.;  Location: SURGERY SAME DAY [facility] ORS;  Service: General    NH DSTR NROLYTC AGNT PARVERTEB FCT SNGL LMBR/SACRAL Left 7/21/2015    Procedure: NEURO DEST FACET L/S W/IG SNGL - L4-S3;  Surgeon: Luis Manuel Mayfield;  Location: [facility] ORS;  Service: Pain Management    NH DSTR NROLYTC AGNT PARVERTEB FCT ADDL LMBR/SACRAL  7/21/2015    Procedure: NEURO DEST FACET L/S W/IG ADDL;  Surgeon: Luis Manuel Mayfield;  Location: [facility] ORS;  Service: Pain Management    PB INJECT RX OTHER PERIPH NERVE  7/21/2015    Procedure: NEUROLYTIC DEST-OTHER NERVE;  Surgeon: Luis Manuel Mayfield;  Location: [facility] ORS;  Service: Pain Management    PB INJECT RX OTHER PERIPH NERVE  7/21/2015    Procedure: NEUROLYTIC DEST-OTHER NERVE;  Surgeon: Luis Manuel Mayfield;  Location: [facility] ORS;  Service: Pain Management    PB INJECT RX OTHER PERIPH NERVE  7/21/2015    Procedure: NEUROLYTIC DEST-OTHER NERVE;  Surgeon: Luis Manuel Mayfield;  Location: [facility] ORS;  Service: Pain Management    NEURO DEST FACET C/T W/IG SNGL  3/10/2015    Performed by Luis Manuel Mayfield at [facility] ORS    NEURO DEST FACET C/T W/IG ADDL  3/10/2015    Performed by Luis Manuel Mayfield at [facility] ORS    NEURO DEST FACET C/T W/IG ADDL  3/10/2015    Performed by Luis Manuel Mayfield at [facility] ORS    NEURO DEST FACET C/T W/IG ADDL  3/10/2015    Performed by Luis Manuel Mayfield at [facility] ORS    NEURO DEST FACET L/S W/IG SNGL  11/18/2014    Performed by Luis Manuel Mayfield at [facility] ORS    NEURO DEST FACET L/S W/IG ADDL  11/18/2014    Performed by Luis Manuel Mayfield at [facility] ORS    GYN SURGERY      hysterectomy    OTHER      spinal cord stimulator implanted    OTHER ORTHOPEDIC SURGERY      right hip surgery        FAMILY HISTORY  History reviewed. No pertinent family  "history.    SOCIAL HISTORY   reports that she has been smoking cigarettes. She started smoking about 10 years ago. She has a 0.2 pack-year smoking history. She has never used smokeless tobacco. She reports current drug use. Drug: Marijuana. She reports that she does not drink alcohol.    CURRENT MEDICATIONS  Previous Medications    ALPRAZOLAM (XANAX) 0.5 MG TAB    Take 0.5 mg by mouth at bedtime as needed for Sleep.    CONJUGATED ESTROGEN (PREMARIN) 0.625 MG TAB    Take 0.625 mg by mouth every day.    CYCLOBENZAPRINE (FLEXERIL) 10 MG TAB        DIVALPROEX (DEPAKOTE) 500 MG TABLET DELAYED RESPONSE    Take 500 mg by mouth 3 times a day.    DULOXETINE (CYMBALTA) 60 MG CPEP    Take 60 mg by mouth 2 times a day.    GABAPENTIN (NEURONTIN) 300 MG CAP    Take 600 mg by mouth 4 times a day.    HYDROXYZINE HCL (ATARAX) 25 MG TAB    TAKE 1 TO 2 TABLETS BY MOUTH ONCE DAILY AT NIGHT AS NEEDED FOR INSOMNIA OR ANXIETY    IBUPROFEN (MOTRIN) 600 MG TAB    Take 600 mg by mouth. AS NEEDED    LORATADINE (CLARITIN) 10 MG TAB    Take 10 mg by mouth.    MOBIC 15 MG TABLET    TAKE 1 TABLET BY MOUTH ONCE A DAY    ONDANSETRON (ZOFRAN ODT) 4 MG TABLET DISPERSIBLE    Take 1 Tab by mouth every 6 hours as needed.    ONDANSETRON (ZOFRAN ODT) 8 MG TABLET DISPERSIBLE        PANTOPRAZOLE (PROTONIX) 20 MG TABLET    Take 20 mg by mouth every day.    PANTOPRAZOLE (PROTONIX) 40 MG TABLET DELAYED RESPONSE        TOPIRAMATE (TOPAMAX) 100 MG TAB    Take 100 mg by mouth 2 times a day.    VITAMIN D, ERGOCALCIFEROL, (DRISDOL) 37566 UNITS CAPS CAPSULE    Take 50,000 Units by mouth every 14 days.       ALLERGIES  Codeine, Aspirin, Latex, and Tape    PHYSICAL EXAM  BP (!) 184/74   Pulse 82   Temp 36.2 °C (97.1 °F) (Temporal)   Resp 20   Ht 1.651 m (5' 5\")   Wt 68 kg (150 lb)   SpO2 95%      Constitutional: Nontoxic appearing. Alert in mild distress.  HENT: Normocephalic, Atraumatic. Bilateral external ears normal. Nose normal.  Moist mucous membranes.  " Oropharynx clear.  Eyes: Pupils are equal and reactive. Conjunctiva normal.   Neck: Supple, full range of motion  Heart: Regular rate and rhythm.  No murmurs.    Lungs: No respiratory distress, normal work of breathing. Lungs clear to auscultation bilaterally.  Abdomen Soft, no distention.  No tenderness to palpation.  Musculoskeletal: Atraumatic. No obvious deformities noted.  No lower extremity edema.  Skin: Warm, Dry.  No erythema, No rash.   Neurologic: Alert and oriented x3. Moving all extremities spontaneously without focal deficits.  Psychiatric: Affect normal, Appears appropriate and not intoxicated. Tearful, anxious, denies suicidal ideation.    DIAGNOSTIC STUDIES / PROCEDURES    EKG  I have independently interpreted this EKG  Results for orders placed or performed during the hospital encounter of 24   EKG (NOW)   Result Value Ref Range    Report       [facility] Emergency Dept.    Test Date:  2024  Pt Name:    MELODIE FARRAR              Department: ER  MRN:        [id number]                      Room:       [facility]  Gender:     Female                       Technician: [id number]  :        1965                   Requested By:MARÍA GAMEZ  Order #:    [id number]                    Reading MD: María Gamez MD    Measurements  Intervals                                Axis  Rate:       65                           P:          67  OH:         176                          QRS:        -12  QRSD:       100                          T:          55  QT:         439  QTc:        457    Interpretive Statements  Sinus rhythm  Anteroseptal infarct, age indeterminate  No acute ST or T wave change  Compared to ECG 2015 10:11:29  Myocardial infarct finding now present  Electronically Signed On 2024 15:27:22 PDT by María Gamez MD         LABS  Labs Reviewed   CBC WITH DIFFERENTIAL - Abnormal; Notable for the following components:       Result Value    MCH 33.1 (*)      Lymphocytes 17.50 (*)     All other components within normal limits   COMP METABOLIC PANEL - Abnormal; Notable for the following components:    Potassium 3.5 (*)     Co2 17 (*)     AST(SGOT) 49 (*)     ALT(SGPT) 60 (*)     Alkaline Phosphatase 102 (*)     All other components within normal limits   VALPROIC ACID - Abnormal; Notable for the following components:    Valproic Acid <2.8 (*)     All other components within normal limits   ESTIMATED GFR   TROPONIN         RADIOLOGY  I have independently interpreted the diagnostic imaging associated with this visit and am waiting the final reading from the radiologist.   My preliminary interpretation is as follows: no intracranial hemorrhage    Radiologist interpretation:  CT-HEAD W/O   Final Result      No acute intracranial abnormality detected.               COURSE & MEDICAL DECISION MAKING    1:08 PM - Patient seen and examined at bedside. Discussed plan of care, including ordering for labs, imaging, and EKG to evaluate and medications for anxiety and pain. Patient agrees to the plan of care. The patient will be resuscitated with 1L NS IV and medicated with Tylenol 1,000 mg PO for pain and Xanax 0.5 mg PO for anxiety. Ordered for EKG, CT-Head w/o, CMP, CBC w/ diff, Estimated GFR, Troponin, and Valproic acid to evaluate her symptoms.       ASSESSMENT, COURSE AND PLAN  Care Narrative: Patient presents with recent headaches, increased anxiety, trouble sleeping.  She is hypertensive on arrival with otherwise reassuring vitals.  She has no focal neurologic deficits on exam.  Imaging was performed due to questionable recent intracranial abnormality however shows no intracranial hemorrhage or mass. She does have some stable findings which need continued follow up with her neurosurgeon.  EKG without obvious ischemia or arrhythmia.  Troponin is negative making ACS unlikely.  Labs are reassuring without leukocytosis, renal dysfunction, electrolyte abnormality.  She has a very  mild transaminitis and possibly some signs of dehydration. No signs of valproic acid toxicity. Will treat symptomatically for headache and anxiety followed by reassessment.      4:34 PM - Upon reassessment, patient is resting comfortably with normal vital signs.  No new complaints at this time. Symptoms improved, resting.  Discussed results with patient and/or family as well as importance of primary care/behavioral health/NSGY follow up.  Patient understands plan of care and strict return precautions for new or changing symptoms.    ADDITIONAL PROBLEM LIST  Problem #1: Situational anxiety - continue Xanax as prescribed by PCP, given behavioral health resources    Problem #2: Acute headache - imaging reassuring, continue established followup with NSGY      DISPOSITION AND DISCUSSIONS  Discussion of management with other QHP or appropriate source(s): Behavioral Health to provide resources      Escalation of care considered, and ultimately not performed:acute inpatient care management, however at this time, the patient is most appropriate for outpatient management    Decision tools and prescription drugs considered including, but not limited to: Pain Medications OTC medications should be sufficient .    The patient will return for new or worsening symptoms and is stable at the time of discharge.    The patient is referred to a primary physician for blood pressure management, diabetic screening, and for all other preventative health concerns.    DISPOSITION:  Patient will be discharged home in stable condition.    FOLLOW UP:  Mohsen Tamasaby, M.D.  [address] [place] [id number] [phone]    Schedule an appointment as soon as possible for a visit       [facility], Emergency Dept  [address] [facility] [address] [phone]    If symptoms worsen      FINAL DIAGNOSIS  1. Situational anxiety    2. Acute nonintractable headache, unspecified headache type        The note  accurately reflects work and decisions made by me.  María Briggs M.D.  5/9/2024  10:52 PM     IIvonne (Dalton), am scribing for, and in the presence of, María Briggs M.D..    Electronically signed by: Ivonne Jaimes ([place]), 5/9/2024    María GILLESPIE M.D. personally performed the services described in this documentation, as scribed by Ivonne Jaimes in my presence, and it is both accurate and complete.

## 2024-05-09 NOTE — ED NOTES
Roommate, Amena, called for update. Pt notified and consented to roommate being updated on medical treatment and care. Pt requests that Rn notify roommate that she may visit pt.

## 2024-05-10 NOTE — DISCHARGE PLANNING
Alert Team:    Pt seeking resources for anxiety and depression. Denies SI/HI/AH/VH's. Pt reports that she was connected to [facility] for therapy and psychiatry, however, stopped going a year ago after some billing issues. PT reports that she would like to get reconnected with outpatient as her anxiety and depression has become more severe. PT given MH resource packet, [facility] info, [facility] info, [place] Warm[facility] info, 998 info. PT reports that she will f/u in the AM. PT had no further questions.    Cesar Crockett RN PRIYANKA

## 2024-05-10 NOTE — ED NOTES
Written and verbal instructions provided to pt. Pt instructed to follow up with PCP, neurosurgery, and  medications from pharmacy. Pt instructed to return to emergency department for new or worsening symptoms. Pt verbalized understanding of discharge instructions. Pt ambulatory upon discharge with all belongings. Safe ride from roommate arranged for pt.

## (undated) DEVICE — CANISTER SUCTION RIGID RED 1500CC (40EA/CA)

## (undated) DEVICE — KIT  I.V. START (100EA/CA)

## (undated) DEVICE — CHLORAPREP 26 ML APPLICATOR - ORANGE TINT(25/CA)

## (undated) DEVICE — CLIP MED LG INTNL HRZN TI ESCP - (20/BX)

## (undated) DEVICE — TUBE E-T HI-LO CUFF 7.0MM (10EA/PK)

## (undated) DEVICE — SUCTION INSTRUMENT YANKAUER BULBOUS TIP W/O VENT (50EA/CA)

## (undated) DEVICE — TROCAR Z THREAD 11 X 100 - BLADED (6/BX)

## (undated) DEVICE — CANNULA W/SEAL11X100ZTHREAD - (12/BX)

## (undated) DEVICE — NEPTUNE 4 PORT MANIFOLD - (20/PK)

## (undated) DEVICE — SENSOR SPO2 NEO LNCS ADHESIVE (20/BX) SEE USER NOTES

## (undated) DEVICE — SCISSORS 5MM CVD (6EA/BX)

## (undated) DEVICE — CANNULA W/SEAL 5X100 Z-THRE - ADED KII (12/BX)

## (undated) DEVICE — PROTECTOR ULNA NERVE - (36PR/CA)

## (undated) DEVICE — GLOVE BIOGEL SZ 8 SURGICAL PF LTX - (50PR/BX 4BX/CA)

## (undated) DEVICE — KIT ANESTHESIA W/CIRCUIT & 3/LT BAG W/FILTER (20EA/CA)

## (undated) DEVICE — SUTURE 0 COATED VICRYL 6-18IN - (12PK/BX)

## (undated) DEVICE — SUTURE GENERAL

## (undated) DEVICE — TUBE NG SALEM SUMP 16FR (50EA/CA)

## (undated) DEVICE — DRESSING TRANSPARENT FILM TEGADERM 4 X 4.75" (50EA/BX)"

## (undated) DEVICE — SODIUM CHL IRRIGATION 0.9% 1000ML (12EA/CA)

## (undated) DEVICE — ELECTRODE DUAL RETURN W/ CORD - (50/PK)

## (undated) DEVICE — NEEDLE INSFL 120MM 14GA VRRS - (20/BX)

## (undated) DEVICE — GLOVE BIOGEL INDICATOR SZ 8.5 SURGICAL PF LTX - (50/BX 4BX/CA)

## (undated) DEVICE — TUBING CLEARLINK DUO-VENT - C-FLO (48EA/CA)

## (undated) DEVICE — GOWN SURGEONS X-LARGE - DISP. (30/CA)

## (undated) DEVICE — LACTATED RINGERS INJ 1000 ML - (14EA/CA 60CA/PF)

## (undated) DEVICE — DRAPESURG STERI-DRAPE LONG - (10/BX 4BX/CA)

## (undated) DEVICE — SLEEVE, VASO, THIGH, MED

## (undated) DEVICE — GOWN WARMING STANDARD FLEX - (30/CA)

## (undated) DEVICE — SET LEADWIRE 5 LEAD BEDSIDE DISPOSABLE ECG (1SET OF 5/EA)

## (undated) DEVICE — SUTURE 0 VICRYL PLUS CT-2 - 27 INCH (36/BX)

## (undated) DEVICE — SPONGE GAUZESTER. 2X2 4-PL - (2/PK 50PK/BX 30BX/CS)

## (undated) DEVICE — MASK ANESTHESIA ADULT  - (100/CA)

## (undated) DEVICE — SUTURE 4-0 VICRYL PLUS FS-2 - 27 INCH (36/BX)

## (undated) DEVICE — TROCAR 5X100 BLADED Z-THREAD - KII (6/BX)

## (undated) DEVICE — SET SUCTION/IRRIGATION WITH DISPOSABLE TIP (6/CA )PART #0250-070-520 IS A SUB

## (undated) DEVICE — TUBE CONNECTING SUCTION - CLEAR PLASTIC STERILE 72 IN (50EA/CA)

## (undated) DEVICE — DRESSING TRANSPARENT FILM TEGADERM 2.375 X 2.75"  (100EA/BX)"

## (undated) DEVICE — HEAD HOLDER JUNIOR/ADULT

## (undated) DEVICE — CATHETER IV 20 GA X 1-1/4 ---SURG.& SDS ONLY--- (50EA/BX)

## (undated) DEVICE — WATER IRRIGATION STERILE 1000ML (12EA/CA)

## (undated) DEVICE — PACK LAP CHOLE OR - (2EA/CA)

## (undated) DEVICE — CANISTER SUCTION 3000ML MECHANICAL FILTER AUTO SHUTOFF MEDI-VAC NONSTERILE LF DISP  (40EA/CA)